# Patient Record
Sex: MALE | Race: WHITE | NOT HISPANIC OR LATINO | Employment: OTHER | URBAN - METROPOLITAN AREA
[De-identification: names, ages, dates, MRNs, and addresses within clinical notes are randomized per-mention and may not be internally consistent; named-entity substitution may affect disease eponyms.]

---

## 2021-06-10 ENCOUNTER — HOSPITAL ENCOUNTER (OUTPATIENT)
Facility: MEDICAL CENTER | Age: 76
End: 2021-06-12
Attending: EMERGENCY MEDICINE | Admitting: INTERNAL MEDICINE
Payer: MEDICARE

## 2021-06-10 ENCOUNTER — APPOINTMENT (OUTPATIENT)
Dept: RADIOLOGY | Facility: MEDICAL CENTER | Age: 76
End: 2021-06-10
Attending: EMERGENCY MEDICINE
Payer: MEDICARE

## 2021-06-10 DIAGNOSIS — N13.9 URINARY OBSTRUCTION: ICD-10-CM

## 2021-06-10 DIAGNOSIS — N17.9 ACUTE RENAL INJURY (HCC): ICD-10-CM

## 2021-06-10 DIAGNOSIS — N20.1 URETERAL STONE: ICD-10-CM

## 2021-06-10 LAB
ALBUMIN SERPL BCP-MCNC: 4.4 G/DL (ref 3.2–4.9)
ALBUMIN/GLOB SERPL: 1.6 G/DL
ALP SERPL-CCNC: 61 U/L (ref 30–99)
ALT SERPL-CCNC: 26 U/L (ref 2–50)
ANION GAP SERPL CALC-SCNC: 11 MMOL/L (ref 7–16)
APPEARANCE UR: CLEAR
AST SERPL-CCNC: 24 U/L (ref 12–45)
BASOPHILS # BLD AUTO: 0.5 % (ref 0–1.8)
BASOPHILS # BLD: 0.03 K/UL (ref 0–0.12)
BILIRUB SERPL-MCNC: 0.7 MG/DL (ref 0.1–1.5)
BILIRUB UR QL STRIP.AUTO: NEGATIVE
BUN SERPL-MCNC: 26 MG/DL (ref 8–22)
CALCIUM SERPL-MCNC: 9 MG/DL (ref 8.4–10.2)
CHLORIDE SERPL-SCNC: 103 MMOL/L (ref 96–112)
CO2 SERPL-SCNC: 25 MMOL/L (ref 20–33)
COLOR UR: YELLOW
CREAT SERPL-MCNC: 2.21 MG/DL (ref 0.5–1.4)
EOSINOPHIL # BLD AUTO: 0.08 K/UL (ref 0–0.51)
EOSINOPHIL NFR BLD: 1.3 % (ref 0–6.9)
EPI CELLS #/AREA URNS HPF: ABNORMAL /HPF
ERYTHROCYTE [DISTWIDTH] IN BLOOD BY AUTOMATED COUNT: 44.3 FL (ref 35.9–50)
GLOBULIN SER CALC-MCNC: 2.8 G/DL (ref 1.9–3.5)
GLUCOSE SERPL-MCNC: 107 MG/DL (ref 65–99)
GLUCOSE UR STRIP.AUTO-MCNC: NEGATIVE MG/DL
HCT VFR BLD AUTO: 40.9 % (ref 42–52)
HGB BLD-MCNC: 13.3 G/DL (ref 14–18)
IMM GRANULOCYTES # BLD AUTO: 0.03 K/UL (ref 0–0.11)
IMM GRANULOCYTES NFR BLD AUTO: 0.5 % (ref 0–0.9)
KETONES UR STRIP.AUTO-MCNC: NEGATIVE MG/DL
LEUKOCYTE ESTERASE UR QL STRIP.AUTO: NEGATIVE
LIPASE SERPL-CCNC: 42 U/L (ref 7–58)
LYMPHOCYTES # BLD AUTO: 1.16 K/UL (ref 1–4.8)
LYMPHOCYTES NFR BLD: 18.5 % (ref 22–41)
MCH RBC QN AUTO: 29.2 PG (ref 27–33)
MCHC RBC AUTO-ENTMCNC: 32.5 G/DL (ref 33.7–35.3)
MCV RBC AUTO: 89.9 FL (ref 81.4–97.8)
MICRO URNS: ABNORMAL
MONOCYTES # BLD AUTO: 0.73 K/UL (ref 0–0.85)
MONOCYTES NFR BLD AUTO: 11.6 % (ref 0–13.4)
NEUTROPHILS # BLD AUTO: 4.25 K/UL (ref 1.82–7.42)
NEUTROPHILS NFR BLD: 67.6 % (ref 44–72)
NITRITE UR QL STRIP.AUTO: NEGATIVE
NRBC # BLD AUTO: 0 K/UL
NRBC BLD-RTO: 0 /100 WBC
PH UR STRIP.AUTO: 6 [PH] (ref 5–8)
PLATELET # BLD AUTO: 184 K/UL (ref 164–446)
PMV BLD AUTO: 9.6 FL (ref 9–12.9)
POTASSIUM SERPL-SCNC: 4.2 MMOL/L (ref 3.6–5.5)
PROT SERPL-MCNC: 7.2 G/DL (ref 6–8.2)
PROT UR QL STRIP: NEGATIVE MG/DL
RBC # BLD AUTO: 4.55 M/UL (ref 4.7–6.1)
RBC # URNS HPF: ABNORMAL /HPF
RBC UR QL AUTO: ABNORMAL
SODIUM SERPL-SCNC: 139 MMOL/L (ref 135–145)
SP GR UR STRIP.AUTO: <=1.005
WBC # BLD AUTO: 6.3 K/UL (ref 4.8–10.8)
WBC #/AREA URNS HPF: ABNORMAL /HPF

## 2021-06-10 PROCEDURE — G0378 HOSPITAL OBSERVATION PER HR: HCPCS

## 2021-06-10 PROCEDURE — 99220 PR INITIAL OBSERVATION CARE,LEVL III: CPT | Performed by: INTERNAL MEDICINE

## 2021-06-10 PROCEDURE — 87426 SARSCOV CORONAVIRUS AG IA: CPT

## 2021-06-10 PROCEDURE — 83690 ASSAY OF LIPASE: CPT

## 2021-06-10 PROCEDURE — 81001 URINALYSIS AUTO W/SCOPE: CPT

## 2021-06-10 PROCEDURE — C9803 HOPD COVID-19 SPEC COLLECT: HCPCS | Performed by: EMERGENCY MEDICINE

## 2021-06-10 PROCEDURE — 700101 HCHG RX REV CODE 250

## 2021-06-10 PROCEDURE — 74176 CT ABD & PELVIS W/O CONTRAST: CPT

## 2021-06-10 PROCEDURE — U0005 INFEC AGEN DETEC AMPLI PROBE: HCPCS

## 2021-06-10 PROCEDURE — U0003 INFECTIOUS AGENT DETECTION BY NUCLEIC ACID (DNA OR RNA); SEVERE ACUTE RESPIRATORY SYNDROME CORONAVIRUS 2 (SARS-COV-2) (CORONAVIRUS DISEASE [COVID-19]), AMPLIFIED PROBE TECHNIQUE, MAKING USE OF HIGH THROUGHPUT TECHNOLOGIES AS DESCRIBED BY CMS-2020-01-R: HCPCS

## 2021-06-10 PROCEDURE — 85025 COMPLETE CBC W/AUTO DIFF WBC: CPT

## 2021-06-10 PROCEDURE — 700105 HCHG RX REV CODE 258: Performed by: EMERGENCY MEDICINE

## 2021-06-10 PROCEDURE — 51702 INSERT TEMP BLADDER CATH: CPT

## 2021-06-10 PROCEDURE — 80053 COMPREHEN METABOLIC PANEL: CPT

## 2021-06-10 PROCEDURE — 303105 HCHG CATHETER EXTRA

## 2021-06-10 PROCEDURE — 99285 EMERGENCY DEPT VISIT HI MDM: CPT

## 2021-06-10 PROCEDURE — 36415 COLL VENOUS BLD VENIPUNCTURE: CPT

## 2021-06-10 RX ORDER — ONDANSETRON 2 MG/ML
4 INJECTION INTRAMUSCULAR; INTRAVENOUS EVERY 4 HOURS PRN
Status: DISCONTINUED | OUTPATIENT
Start: 2021-06-10 | End: 2021-06-12 | Stop reason: HOSPADM

## 2021-06-10 RX ORDER — BISACODYL 10 MG
10 SUPPOSITORY, RECTAL RECTAL
Status: DISCONTINUED | OUTPATIENT
Start: 2021-06-10 | End: 2021-06-12 | Stop reason: HOSPADM

## 2021-06-10 RX ORDER — OXYCODONE HYDROCHLORIDE 5 MG/1
5 TABLET ORAL
Status: DISCONTINUED | OUTPATIENT
Start: 2021-06-10 | End: 2021-06-12 | Stop reason: HOSPADM

## 2021-06-10 RX ORDER — SODIUM CHLORIDE, SODIUM LACTATE, POTASSIUM CHLORIDE, CALCIUM CHLORIDE 600; 310; 30; 20 MG/100ML; MG/100ML; MG/100ML; MG/100ML
1000 INJECTION, SOLUTION INTRAVENOUS ONCE
Status: COMPLETED | OUTPATIENT
Start: 2021-06-10 | End: 2021-06-10

## 2021-06-10 RX ORDER — LIDOCAINE HYDROCHLORIDE 20 MG/ML
JELLY TOPICAL
Status: COMPLETED
Start: 2021-06-10 | End: 2021-06-10

## 2021-06-10 RX ORDER — TERAZOSIN 1 MG/1
1 CAPSULE ORAL NIGHTLY
Status: ON HOLD | COMMUNITY
End: 2021-06-12

## 2021-06-10 RX ORDER — ONDANSETRON 4 MG/1
4 TABLET, ORALLY DISINTEGRATING ORAL EVERY 4 HOURS PRN
Status: DISCONTINUED | OUTPATIENT
Start: 2021-06-10 | End: 2021-06-12 | Stop reason: HOSPADM

## 2021-06-10 RX ORDER — VALSARTAN 80 MG/1
80 TABLET ORAL DAILY
Status: DISCONTINUED | OUTPATIENT
Start: 2021-06-11 | End: 2021-06-12 | Stop reason: HOSPADM

## 2021-06-10 RX ORDER — TERAZOSIN 1 MG/1
1 CAPSULE ORAL NIGHTLY
Status: DISCONTINUED | OUTPATIENT
Start: 2021-06-10 | End: 2021-06-11

## 2021-06-10 RX ORDER — POLYETHYLENE GLYCOL 3350 17 G/17G
1 POWDER, FOR SOLUTION ORAL
Status: DISCONTINUED | OUTPATIENT
Start: 2021-06-10 | End: 2021-06-12 | Stop reason: HOSPADM

## 2021-06-10 RX ORDER — AMOXICILLIN 250 MG
2 CAPSULE ORAL 2 TIMES DAILY
Status: DISCONTINUED | OUTPATIENT
Start: 2021-06-10 | End: 2021-06-12 | Stop reason: HOSPADM

## 2021-06-10 RX ORDER — OXYCODONE HYDROCHLORIDE 10 MG/1
10 TABLET ORAL
Status: DISCONTINUED | OUTPATIENT
Start: 2021-06-10 | End: 2021-06-12 | Stop reason: HOSPADM

## 2021-06-10 RX ORDER — ENALAPRILAT 1.25 MG/ML
1.25 INJECTION INTRAVENOUS EVERY 6 HOURS PRN
Status: DISCONTINUED | OUTPATIENT
Start: 2021-06-10 | End: 2021-06-12 | Stop reason: HOSPADM

## 2021-06-10 RX ORDER — VALSARTAN 80 MG/1
80 TABLET ORAL DAILY
COMMUNITY

## 2021-06-10 RX ORDER — HYDROMORPHONE HYDROCHLORIDE 1 MG/ML
0.5 INJECTION, SOLUTION INTRAMUSCULAR; INTRAVENOUS; SUBCUTANEOUS
Status: DISCONTINUED | OUTPATIENT
Start: 2021-06-10 | End: 2021-06-12 | Stop reason: HOSPADM

## 2021-06-10 RX ORDER — SODIUM CHLORIDE 9 MG/ML
INJECTION, SOLUTION INTRAVENOUS CONTINUOUS
Status: DISCONTINUED | OUTPATIENT
Start: 2021-06-10 | End: 2021-06-12 | Stop reason: HOSPADM

## 2021-06-10 RX ORDER — LABETALOL HYDROCHLORIDE 5 MG/ML
10 INJECTION, SOLUTION INTRAVENOUS EVERY 4 HOURS PRN
Status: DISCONTINUED | OUTPATIENT
Start: 2021-06-10 | End: 2021-06-12 | Stop reason: HOSPADM

## 2021-06-10 RX ORDER — ATORVASTATIN CALCIUM 20 MG/1
20 TABLET, FILM COATED ORAL DAILY
COMMUNITY

## 2021-06-10 RX ORDER — ATORVASTATIN CALCIUM 20 MG/1
20 TABLET, FILM COATED ORAL DAILY
Status: DISCONTINUED | OUTPATIENT
Start: 2021-06-11 | End: 2021-06-12 | Stop reason: HOSPADM

## 2021-06-10 RX ORDER — ACETAMINOPHEN 325 MG/1
650 TABLET ORAL EVERY 6 HOURS PRN
Status: DISCONTINUED | OUTPATIENT
Start: 2021-06-10 | End: 2021-06-12 | Stop reason: HOSPADM

## 2021-06-10 RX ADMIN — LIDOCAINE HYDROCHLORIDE 200 MG: 20 JELLY TOPICAL at 23:00

## 2021-06-10 RX ADMIN — SODIUM CHLORIDE, POTASSIUM CHLORIDE, SODIUM LACTATE AND CALCIUM CHLORIDE 1000 ML: 600; 310; 30; 20 INJECTION, SOLUTION INTRAVENOUS at 22:24

## 2021-06-10 ASSESSMENT — FIBROSIS 4 INDEX: FIB4 SCORE: 1.89

## 2021-06-11 ENCOUNTER — ANESTHESIA EVENT (OUTPATIENT)
Dept: SURGERY | Facility: MEDICAL CENTER | Age: 76
End: 2021-06-11
Payer: MEDICARE

## 2021-06-11 ENCOUNTER — ANESTHESIA (OUTPATIENT)
Dept: SURGERY | Facility: MEDICAL CENTER | Age: 76
End: 2021-06-11
Payer: MEDICARE

## 2021-06-11 ENCOUNTER — APPOINTMENT (OUTPATIENT)
Dept: RADIOLOGY | Facility: MEDICAL CENTER | Age: 76
End: 2021-06-11
Attending: UROLOGY
Payer: MEDICARE

## 2021-06-11 LAB
ANION GAP SERPL CALC-SCNC: 12 MMOL/L (ref 7–16)
BUN SERPL-MCNC: 22 MG/DL (ref 8–22)
CALCIUM SERPL-MCNC: 8.3 MG/DL (ref 8.4–10.2)
CHLORIDE SERPL-SCNC: 106 MMOL/L (ref 96–112)
CO2 SERPL-SCNC: 24 MMOL/L (ref 20–33)
CREAT SERPL-MCNC: 1.59 MG/DL (ref 0.5–1.4)
ERYTHROCYTE [DISTWIDTH] IN BLOOD BY AUTOMATED COUNT: 44.2 FL (ref 35.9–50)
GLUCOSE SERPL-MCNC: 119 MG/DL (ref 65–99)
HCT VFR BLD AUTO: 36.3 % (ref 42–52)
HGB BLD-MCNC: 11.7 G/DL (ref 14–18)
MCH RBC QN AUTO: 28.5 PG (ref 27–33)
MCHC RBC AUTO-ENTMCNC: 32.2 G/DL (ref 33.7–35.3)
MCV RBC AUTO: 88.3 FL (ref 81.4–97.8)
PATHOLOGY CONSULT NOTE: NORMAL
PLATELET # BLD AUTO: 171 K/UL (ref 164–446)
PMV BLD AUTO: 9.8 FL (ref 9–12.9)
POTASSIUM SERPL-SCNC: 4.2 MMOL/L (ref 3.6–5.5)
RBC # BLD AUTO: 4.11 M/UL (ref 4.7–6.1)
SARS-COV+SARS-COV-2 AG RESP QL IA.RAPID: NOTDETECTED
SARS-COV-2 RNA RESP QL NAA+PROBE: NOTDETECTED
SODIUM SERPL-SCNC: 142 MMOL/L (ref 135–145)
SPECIMEN SOURCE: NORMAL
SPECIMEN SOURCE: NORMAL
WBC # BLD AUTO: 4.6 K/UL (ref 4.8–10.8)

## 2021-06-11 PROCEDURE — 85027 COMPLETE CBC AUTOMATED: CPT

## 2021-06-11 PROCEDURE — G0378 HOSPITAL OBSERVATION PER HR: HCPCS

## 2021-06-11 PROCEDURE — 160035 HCHG PACU - 1ST 60 MINS PHASE I: Performed by: UROLOGY

## 2021-06-11 PROCEDURE — 700102 HCHG RX REV CODE 250 W/ 637 OVERRIDE(OP): Performed by: INTERNAL MEDICINE

## 2021-06-11 PROCEDURE — C2617 STENT, NON-COR, TEM W/O DEL: HCPCS | Performed by: UROLOGY

## 2021-06-11 PROCEDURE — 700111 HCHG RX REV CODE 636 W/ 250 OVERRIDE (IP): Performed by: ANESTHESIOLOGY

## 2021-06-11 PROCEDURE — 501329 HCHG SET, CYSTO IRRIG Y TUR: Performed by: UROLOGY

## 2021-06-11 PROCEDURE — 700102 HCHG RX REV CODE 250 W/ 637 OVERRIDE(OP): Performed by: UROLOGY

## 2021-06-11 PROCEDURE — 160039 HCHG SURGERY MINUTES - EA ADDL 1 MIN LEVEL 3: Performed by: UROLOGY

## 2021-06-11 PROCEDURE — 700105 HCHG RX REV CODE 258: Performed by: ANESTHESIOLOGY

## 2021-06-11 PROCEDURE — 99232 SBSQ HOSP IP/OBS MODERATE 35: CPT | Performed by: FAMILY MEDICINE

## 2021-06-11 PROCEDURE — 502240 HCHG MISC OR SUPPLY RC 0272: Performed by: UROLOGY

## 2021-06-11 PROCEDURE — 700101 HCHG RX REV CODE 250: Performed by: ANESTHESIOLOGY

## 2021-06-11 PROCEDURE — 160048 HCHG OR STATISTICAL LEVEL 1-5: Performed by: UROLOGY

## 2021-06-11 PROCEDURE — 500879 HCHG PACK, CYSTO: Performed by: UROLOGY

## 2021-06-11 PROCEDURE — 500062 HCHG BASKET: Performed by: UROLOGY

## 2021-06-11 PROCEDURE — 160028 HCHG SURGERY MINUTES - 1ST 30 MINS LEVEL 3: Performed by: UROLOGY

## 2021-06-11 PROCEDURE — 700105 HCHG RX REV CODE 258: Performed by: INTERNAL MEDICINE

## 2021-06-11 PROCEDURE — 160002 HCHG RECOVERY MINUTES (STAT): Performed by: UROLOGY

## 2021-06-11 PROCEDURE — A9270 NON-COVERED ITEM OR SERVICE: HCPCS | Performed by: INTERNAL MEDICINE

## 2021-06-11 PROCEDURE — 700117 HCHG RX CONTRAST REV CODE 255: Performed by: UROLOGY

## 2021-06-11 PROCEDURE — C1894 INTRO/SHEATH, NON-LASER: HCPCS | Performed by: UROLOGY

## 2021-06-11 PROCEDURE — 160009 HCHG ANES TIME/MIN: Performed by: UROLOGY

## 2021-06-11 PROCEDURE — C1758 CATHETER, URETERAL: HCPCS | Performed by: UROLOGY

## 2021-06-11 PROCEDURE — A9270 NON-COVERED ITEM OR SERVICE: HCPCS | Performed by: UROLOGY

## 2021-06-11 PROCEDURE — 36415 COLL VENOUS BLD VENIPUNCTURE: CPT

## 2021-06-11 PROCEDURE — 80048 BASIC METABOLIC PNL TOTAL CA: CPT

## 2021-06-11 PROCEDURE — 88300 SURGICAL PATH GROSS: CPT

## 2021-06-11 PROCEDURE — 82365 CALCULUS SPECTROSCOPY: CPT

## 2021-06-11 DEVICE — STENT UROLOGICAL POLARIS 6X28  ULTRA: Type: IMPLANTABLE DEVICE | Site: URETER | Status: FUNCTIONAL

## 2021-06-11 RX ORDER — OXYCODONE HCL 5 MG/5 ML
5 SOLUTION, ORAL ORAL
Status: DISCONTINUED | OUTPATIENT
Start: 2021-06-11 | End: 2021-06-11 | Stop reason: HOSPADM

## 2021-06-11 RX ORDER — KETOROLAC TROMETHAMINE 30 MG/ML
INJECTION, SOLUTION INTRAMUSCULAR; INTRAVENOUS PRN
Status: DISCONTINUED | OUTPATIENT
Start: 2021-06-11 | End: 2021-06-11 | Stop reason: SURG

## 2021-06-11 RX ORDER — DIPHENHYDRAMINE HYDROCHLORIDE 50 MG/ML
12.5 INJECTION INTRAMUSCULAR; INTRAVENOUS
Status: DISCONTINUED | OUTPATIENT
Start: 2021-06-11 | End: 2021-06-11 | Stop reason: HOSPADM

## 2021-06-11 RX ORDER — HYDROMORPHONE HYDROCHLORIDE 1 MG/ML
0.2 INJECTION, SOLUTION INTRAMUSCULAR; INTRAVENOUS; SUBCUTANEOUS
Status: DISCONTINUED | OUTPATIENT
Start: 2021-06-11 | End: 2021-06-11 | Stop reason: HOSPADM

## 2021-06-11 RX ORDER — ONDANSETRON 2 MG/ML
4 INJECTION INTRAMUSCULAR; INTRAVENOUS
Status: DISCONTINUED | OUTPATIENT
Start: 2021-06-11 | End: 2021-06-11 | Stop reason: HOSPADM

## 2021-06-11 RX ORDER — SODIUM CHLORIDE, SODIUM LACTATE, POTASSIUM CHLORIDE, CALCIUM CHLORIDE 600; 310; 30; 20 MG/100ML; MG/100ML; MG/100ML; MG/100ML
INJECTION, SOLUTION INTRAVENOUS CONTINUOUS
Status: DISCONTINUED | OUTPATIENT
Start: 2021-06-11 | End: 2021-06-11 | Stop reason: HOSPADM

## 2021-06-11 RX ORDER — DEXAMETHASONE SODIUM PHOSPHATE 4 MG/ML
INJECTION, SOLUTION INTRA-ARTICULAR; INTRALESIONAL; INTRAMUSCULAR; INTRAVENOUS; SOFT TISSUE PRN
Status: DISCONTINUED | OUTPATIENT
Start: 2021-06-11 | End: 2021-06-11 | Stop reason: SURG

## 2021-06-11 RX ORDER — HYDROMORPHONE HYDROCHLORIDE 1 MG/ML
0.5 INJECTION, SOLUTION INTRAMUSCULAR; INTRAVENOUS; SUBCUTANEOUS
Status: DISCONTINUED | OUTPATIENT
Start: 2021-06-11 | End: 2021-06-11 | Stop reason: HOSPADM

## 2021-06-11 RX ORDER — CEFAZOLIN SODIUM 1 G/3ML
INJECTION, POWDER, FOR SOLUTION INTRAMUSCULAR; INTRAVENOUS PRN
Status: DISCONTINUED | OUTPATIENT
Start: 2021-06-11 | End: 2021-06-11 | Stop reason: SURG

## 2021-06-11 RX ORDER — HALOPERIDOL 5 MG/ML
1 INJECTION INTRAMUSCULAR
Status: DISCONTINUED | OUTPATIENT
Start: 2021-06-11 | End: 2021-06-11 | Stop reason: HOSPADM

## 2021-06-11 RX ORDER — HYDRALAZINE HYDROCHLORIDE 20 MG/ML
5 INJECTION INTRAMUSCULAR; INTRAVENOUS
Status: DISCONTINUED | OUTPATIENT
Start: 2021-06-11 | End: 2021-06-11 | Stop reason: HOSPADM

## 2021-06-11 RX ORDER — ONDANSETRON 2 MG/ML
INJECTION INTRAMUSCULAR; INTRAVENOUS PRN
Status: DISCONTINUED | OUTPATIENT
Start: 2021-06-11 | End: 2021-06-11 | Stop reason: SURG

## 2021-06-11 RX ORDER — LIDOCAINE HYDROCHLORIDE 20 MG/ML
INJECTION, SOLUTION EPIDURAL; INFILTRATION; INTRACAUDAL; PERINEURAL PRN
Status: DISCONTINUED | OUTPATIENT
Start: 2021-06-11 | End: 2021-06-11 | Stop reason: SURG

## 2021-06-11 RX ORDER — OXYCODONE HCL 5 MG/5 ML
10 SOLUTION, ORAL ORAL
Status: DISCONTINUED | OUTPATIENT
Start: 2021-06-11 | End: 2021-06-11 | Stop reason: HOSPADM

## 2021-06-11 RX ORDER — MIDAZOLAM HYDROCHLORIDE 1 MG/ML
INJECTION INTRAMUSCULAR; INTRAVENOUS PRN
Status: DISCONTINUED | OUTPATIENT
Start: 2021-06-11 | End: 2021-06-11 | Stop reason: SURG

## 2021-06-11 RX ORDER — SODIUM CHLORIDE, SODIUM GLUCONATE, SODIUM ACETATE, POTASSIUM CHLORIDE AND MAGNESIUM CHLORIDE 526; 502; 368; 37; 30 MG/100ML; MG/100ML; MG/100ML; MG/100ML; MG/100ML
INJECTION, SOLUTION INTRAVENOUS
Status: DISCONTINUED | OUTPATIENT
Start: 2021-06-11 | End: 2021-06-11 | Stop reason: SURG

## 2021-06-11 RX ORDER — HYDROMORPHONE HYDROCHLORIDE 1 MG/ML
0.4 INJECTION, SOLUTION INTRAMUSCULAR; INTRAVENOUS; SUBCUTANEOUS
Status: DISCONTINUED | OUTPATIENT
Start: 2021-06-11 | End: 2021-06-11 | Stop reason: HOSPADM

## 2021-06-11 RX ORDER — TAMSULOSIN HYDROCHLORIDE 0.4 MG/1
0.4 CAPSULE ORAL 2 TIMES DAILY
Status: DISCONTINUED | OUTPATIENT
Start: 2021-06-11 | End: 2021-06-12 | Stop reason: HOSPADM

## 2021-06-11 RX ADMIN — MIDAZOLAM HYDROCHLORIDE 2 MG: 1 INJECTION, SOLUTION INTRAMUSCULAR; INTRAVENOUS at 14:58

## 2021-06-11 RX ADMIN — TAMSULOSIN HYDROCHLORIDE 0.4 MG: 0.4 CAPSULE ORAL at 17:55

## 2021-06-11 RX ADMIN — ONDANSETRON 4 MG: 2 INJECTION INTRAMUSCULAR; INTRAVENOUS at 15:59

## 2021-06-11 RX ADMIN — ATORVASTATIN CALCIUM 20 MG: 20 TABLET, FILM COATED ORAL at 05:21

## 2021-06-11 RX ADMIN — PROPOFOL 150 MG: 10 INJECTION, EMULSION INTRAVENOUS at 15:02

## 2021-06-11 RX ADMIN — FENTANYL CITRATE 50 MCG: 50 INJECTION, SOLUTION INTRAMUSCULAR; INTRAVENOUS at 15:12

## 2021-06-11 RX ADMIN — CEFAZOLIN 2 G: 1 INJECTION, POWDER, FOR SOLUTION INTRAVENOUS at 14:58

## 2021-06-11 RX ADMIN — FENTANYL CITRATE 50 MCG: 50 INJECTION, SOLUTION INTRAMUSCULAR; INTRAVENOUS at 15:29

## 2021-06-11 RX ADMIN — TERAZOSIN HYDROCHLORIDE 1 MG: 1 CAPSULE ORAL at 00:23

## 2021-06-11 RX ADMIN — LIDOCAINE HYDROCHLORIDE 100 MG: 20 INJECTION, SOLUTION EPIDURAL; INFILTRATION; INTRACAUDAL; PERINEURAL at 15:02

## 2021-06-11 RX ADMIN — SODIUM CHLORIDE, SODIUM GLUCONATE, SODIUM ACETATE, POTASSIUM CHLORIDE AND MAGNESIUM CHLORIDE: 526; 502; 368; 37; 30 INJECTION, SOLUTION INTRAVENOUS at 15:46

## 2021-06-11 RX ADMIN — FENTANYL CITRATE 50 MCG: 50 INJECTION, SOLUTION INTRAMUSCULAR; INTRAVENOUS at 15:48

## 2021-06-11 RX ADMIN — FENTANYL CITRATE 100 MCG: 50 INJECTION, SOLUTION INTRAMUSCULAR; INTRAVENOUS at 15:02

## 2021-06-11 RX ADMIN — DEXAMETHASONE SODIUM PHOSPHATE 8 MG: 4 INJECTION, SOLUTION INTRAMUSCULAR; INTRAVENOUS at 15:06

## 2021-06-11 RX ADMIN — SODIUM CHLORIDE, SODIUM GLUCONATE, SODIUM ACETATE, POTASSIUM CHLORIDE AND MAGNESIUM CHLORIDE: 526; 502; 368; 37; 30 INJECTION, SOLUTION INTRAVENOUS at 14:58

## 2021-06-11 RX ADMIN — SODIUM CHLORIDE: 9 INJECTION, SOLUTION INTRAVENOUS at 00:24

## 2021-06-11 RX ADMIN — KETOROLAC TROMETHAMINE 15 MG: 30 INJECTION, SOLUTION INTRAMUSCULAR at 15:59

## 2021-06-11 ASSESSMENT — ENCOUNTER SYMPTOMS
WEAKNESS: 0
NAUSEA: 0
ABDOMINAL PAIN: 0
PALPITATIONS: 0
CHILLS: 0
DIARRHEA: 0
CONSTIPATION: 0
LOSS OF CONSCIOUSNESS: 0
FEVER: 0
DIZZINESS: 0
SHORTNESS OF BREATH: 0
FALLS: 0
TINGLING: 0
STRIDOR: 0
HEADACHES: 0
DEPRESSION: 0
VOMITING: 0
SPUTUM PRODUCTION: 0
MYALGIAS: 0
FLANK PAIN: 1
COUGH: 0

## 2021-06-11 ASSESSMENT — LIFESTYLE VARIABLES
TOTAL SCORE: 0
HAVE PEOPLE ANNOYED YOU BY CRITICIZING YOUR DRINKING: NO
CONSUMPTION TOTAL: POSITIVE
EVER HAD A DRINK FIRST THING IN THE MORNING TO STEADY YOUR NERVES TO GET RID OF A HANGOVER: NO
HOW MANY TIMES IN THE PAST YEAR HAVE YOU HAD 5 OR MORE DRINKS IN A DAY: 15
ALCOHOL_USE: YES
TOTAL SCORE: 0
EVER FELT BAD OR GUILTY ABOUT YOUR DRINKING: NO
ON A TYPICAL DAY WHEN YOU DRINK ALCOHOL HOW MANY DRINKS DO YOU HAVE: 3
AVERAGE NUMBER OF DAYS PER WEEK YOU HAVE A DRINK CONTAINING ALCOHOL: 7
HAVE YOU EVER FELT YOU SHOULD CUT DOWN ON YOUR DRINKING: NO
TOTAL SCORE: 0

## 2021-06-11 ASSESSMENT — COGNITIVE AND FUNCTIONAL STATUS - GENERAL
SUGGESTED CMS G CODE MODIFIER MOBILITY: CH
MOBILITY SCORE: 24
SUGGESTED CMS G CODE MODIFIER DAILY ACTIVITY: CH
DAILY ACTIVITIY SCORE: 24

## 2021-06-11 ASSESSMENT — PAIN DESCRIPTION - PAIN TYPE
TYPE: ACUTE PAIN;SURGICAL PAIN
TYPE: ACUTE PAIN
TYPE: ACUTE PAIN
TYPE: ACUTE PAIN;SURGICAL PAIN

## 2021-06-11 ASSESSMENT — PAIN SCALES - GENERAL: PAIN_LEVEL: 0

## 2021-06-11 ASSESSMENT — PATIENT HEALTH QUESTIONNAIRE - PHQ9
1. LITTLE INTEREST OR PLEASURE IN DOING THINGS: NOT AT ALL
SUM OF ALL RESPONSES TO PHQ9 QUESTIONS 1 AND 2: 0
2. FEELING DOWN, DEPRESSED, IRRITABLE, OR HOPELESS: NOT AT ALL

## 2021-06-11 NOTE — PROGRESS NOTES
Pt had no c/o pain this shift.  Carcamo draining clear, yellow urine.  Pt ambulating with x1 SBA.  Tolerating RA.   Pt has been NPO with sips with meds.

## 2021-06-11 NOTE — H&P
[facility] Medicine History & Physical Note    Date of Service  6/10/2021    Primary Care Physician  Deny Delcid M.D.    Consultants  Urology-Dr. Huggins    Code Status  Full Code    Chief Complaint  Chief Complaint   Patient presents with   • Flank Pain     Patient report of left flank pain with painful with urination and urgency.  No blood in urine. No fever.  Radiating pain to the left side but right now.        History of Presenting Illness  76 y.o. male who presented 6/10/2021 with left flank pain.  States his pain started yesterday, lasted through the night however this morning it improved.  He states then again later in the day it came back, more severe so he presented to the emergency department.  He states when it comes its there for hours however it did completely resolve and he was in his usual state of health this morning.  He also states he does have BPH, takes Hytrin, he noted yesterday he began having difficulty urinating as well, took extra Hytrin today.  He denies any hematuria, no fever chills, dysuria.  I did discuss the case including labs and imaging with the ER physician.    Review of Systems  Review of Systems   Constitutional: Negative for chills, fever and malaise/fatigue.   HENT: Negative for congestion.    Respiratory: Negative for cough, sputum production, shortness of breath and stridor.    Cardiovascular: Negative for chest pain, palpitations and leg swelling.   Gastrointestinal: Negative for abdominal pain, constipation, diarrhea, nausea and vomiting.   Genitourinary: Positive for flank pain. Negative for dysuria and urgency.        Urinary difficulty    Musculoskeletal: Negative for falls and myalgias.   Neurological: Negative for dizziness, tingling, loss of consciousness, weakness and headaches.   Psychiatric/Behavioral: Negative for depression and suicidal ideas.   All other systems reviewed and are negative.      Past Medical History   has a past medical history of  Hypercholesteremia, Hypertension, and Urinary bladder disorder.    Surgical History  None    Family History  Reviewed, nonpertinent    Social History   reports that he has never smoked. He has never used smokeless tobacco. He reports current alcohol use. He reports previous drug use.    Allergies  No Known Allergies    Medications  Prior to Admission Medications   Prescriptions Last Dose Informant Patient Reported? Taking?   atorvastatin (LIPITOR) 20 MG Tab 6/10/2021 at 0700  Yes Yes   Sig: Take 20 mg by mouth every evening.   terazosin (HYTRIN) 1 MG Cap 6/10/2021 at 0400  Yes Yes   Sig: Take 1 mg by mouth every evening.   valsartan (DIOVAN) 80 MG Tab 6/10/2021 at 0700  Yes Yes   Sig: Take 80 mg by mouth every day.      Facility-Administered Medications: None       Physical Exam  Temp:  [36.8 °C (98.3 °F)] 36.8 °C (98.3 °F)  Pulse:  [66-69] 66  Resp:  [16] 16  BP: (120-152)/(66-78) 120/66  SpO2:  [97 %] 97 %    Physical Exam  Vitals and nursing note reviewed.   Constitutional:       General: He is not in acute distress.     Appearance: He is well-developed. He is not toxic-appearing or diaphoretic.   HENT:      Head: Normocephalic and atraumatic.      Right Ear: External ear normal.      Left Ear: External ear normal.      Nose: Nose normal. No congestion or rhinorrhea.      Mouth/Throat:      Mouth: Mucous membranes are dry.      Pharynx: No oropharyngeal exudate.   Eyes:      General:         Right eye: No discharge.         Left eye: No discharge.      Extraocular Movements: Extraocular movements intact.   Neck:      Trachea: No tracheal deviation.   Cardiovascular:      Rate and Rhythm: Normal rate and regular rhythm.      Heart sounds: No murmur heard.   No friction rub. No gallop.    Pulmonary:      Effort: Pulmonary effort is normal. No respiratory distress.      Breath sounds: Normal breath sounds. No stridor. No wheezing or rales.   Chest:      Chest wall: No tenderness.   Abdominal:      General: Bowel  sounds are normal. There is no distension.      Palpations: Abdomen is soft.      Tenderness: There is no abdominal tenderness.   Musculoskeletal:         General: No tenderness. Normal range of motion.      Cervical back: Normal range of motion and neck supple. No edema or erythema.      Right lower leg: No edema.      Left lower leg: No edema.   Lymphadenopathy:      Cervical: No cervical adenopathy.   Skin:     General: Skin is warm and dry.      Coloration: Skin is not jaundiced.      Findings: No erythema or rash.   Neurological:      General: No focal deficit present.      Mental Status: He is alert and oriented to person, place, and time.      Cranial Nerves: No cranial nerve deficit.   Psychiatric:         Mood and Affect: Mood normal.         Behavior: Behavior normal.         Thought Content: Thought content normal.         Judgment: Judgment normal.         Laboratory:  Recent Labs     06/10/21  1824   WBC 6.3   RBC 4.55*   HEMOGLOBIN 13.3*   HEMATOCRIT 40.9*   MCV 89.9   MCH 29.2   MCHC 32.5*   RDW 44.3   PLATELETCT 184   MPV 9.6     Recent Labs     06/10/21  1824   SODIUM 139   POTASSIUM 4.2   CHLORIDE 103   CO2 25   GLUCOSE 107*   BUN 26*   CREATININE 2.21*   CALCIUM 9.0     Recent Labs     06/10/21  1824   ALTSGPT 26   ASTSGOT 24   ALKPHOSPHAT 61   TBILIRUBIN 0.7   LIPASE 42   GLUCOSE 107*         No results for input(s): NTPROBNP in the last 72 hours.      No results for input(s): TROPONINT in the last 72 hours.    Imaging:  CT-RENAL COLIC EVALUATION(A/P W/O)   Final Result         1.  11 mm proximal left ureteral stone producing mild to moderate left hydronephrosis.   2.  Nonobstructing right renal stones.   3.  Right renal cortical scarring. No hydronephrosis.   4.  Distended urinary bladder.   5.  Prostatomegaly.   6.  Atherosclerosis.                     Assessment/Plan:  I anticipate this patient is appropriate for observation status at this time.    Urinary retention- (present on  admission)  Assessment & Plan  -Due to BPH  -Carcamo is now in place, keep Carcamo, patient will likely need to be discharged with  -Await urology recommendations    Hydronephrosis- (present on admission)  Assessment & Plan  -Mild to moderate on the left, due to obstructing left ureteral stone that is 11 mm  -Start IV fluids  -Urology has been consulted, await additional recommendations    Ureteral stone- (present on admission)  Assessment & Plan  -Noted on imaging, may need stenting  -Causing hydronephrosis  -Await urology recommendations    Renal failure- (present on admission)  Assessment & Plan  -Patient does not know his baseline kidney function  -There is worsening from his baseline, I do anticipate improvement to now that a Carcamo has been placed  -Because of this, I do think it is okay to continue his home valsartan  -Obed BMP in the morning, if kidney function has worsened, would recommend stopping ARB    BPH (benign prostatic hyperplasia)- (present on admission)  Assessment & Plan  -Continue home Hytrin  -Cordova urinary retention  -Carcamo is now in place  -Wait additional recommendations per urology    Hypertension- (present on admission)  Assessment & Plan  -Continue home valsartan  -Start as needed enalapril and labetalol   -Adjust as needed    Hypercholesteremia- (present on admission)  Assessment & Plan  -Continue home statin

## 2021-06-11 NOTE — ASSESSMENT & PLAN NOTE
-Continue home Hytrin  -Fitch is now in place for retention  - Discussed with Carl Armas this am - duration of fitch requirement will be addressed by Dr Huggins

## 2021-06-11 NOTE — ASSESSMENT & PLAN NOTE
-Due to BPH  -Fitch is now in place, duration of fitch requirement will be addressed by Dr Huggins

## 2021-06-11 NOTE — ANESTHESIA PROCEDURE NOTES
Airway    Date/Time: 6/11/2021 3:03 PM  Performed by: Ector Wynn M.D.  Authorized by: Ector Wynn M.D.     Location:  OR  Urgency:  Elective  Indications for Airway Management:  Anesthesia      Spontaneous Ventilation: absent    Sedation Level:  Deep  Preoxygenated: Yes    Final Airway Type:  Supraglottic airway  Final Supraglottic Airway:  Standard LMA    SGA Size:  4  Number of Attempts at Approach:  1   Atraumatic insertion, good seal

## 2021-06-11 NOTE — PROGRESS NOTES
[facility] Medicine Daily Progress Note    Date of Service  6/11/2021    Chief Complaint  76 y.o. male admitted 6/10/2021 with L sided flank pain    Hospital Course  76 y.o. male who presented 6/10/2021 with left flank pain.  States his pain started yesterday, lasted through the night however this morning it improved.  He states then again later in the day it came back, more severe so he presented to the emergency department.  He states when it comes its there for hours however it did completely resolve and he was in his usual state of health this morning.  He also states he does have BPH, takes Hytrin, he noted yesterday he began having difficulty urinating as well, took extra Hytrin today.  He denies any hematuria, no fever chills, dysuria.  I did discuss the case including labs and imaging with the ER physician.    Interval Problem Update  6/11 - Patient without complaints today - flank pain is improved, fitch in place and draining. Vitals are stable, afebrile, creatinine is improving. He denies a history of CKD. He states he is leaving town shortly and won't return until 4th of July weekend - Uro aware and will discuss options with pt, currently planned for CULTS today.    Consultants/Specialty  Urology    Code Status  Full Code    Disposition  Home in 1-2 days     Review of Systems  Review of Systems   Constitutional: Negative for chills and fever.   Respiratory: Negative for cough and shortness of breath.    Cardiovascular: Negative for chest pain and leg swelling.   Genitourinary: Negative for dysuria and hematuria.   All other systems reviewed and are negative.       Physical Exam  Temp:  [36.3 °C (97.4 °F)-36.8 °C (98.3 °F)] 36.6 °C (97.8 °F)  Pulse:  [66-73] 69  Resp:  [15-18] 18  BP: (120-152)/(66-78) 151/68  SpO2:  [92 %-97 %] 95 %    Physical Exam  Vitals and nursing note reviewed.   Constitutional:       Appearance: Normal appearance.   HENT:      Head: Normocephalic and atraumatic.      Mouth/Throat:       Mouth: Mucous membranes are moist.   Cardiovascular:      Rate and Rhythm: Normal rate and regular rhythm.      Heart sounds: No murmur heard.     Pulmonary:      Effort: Pulmonary effort is normal. No respiratory distress.      Breath sounds: Normal breath sounds. No wheezing, rhonchi or rales.   Abdominal:      General: Abdomen is flat. Bowel sounds are normal. There is no distension.      Palpations: Abdomen is soft.      Tenderness: There is no abdominal tenderness. There is no guarding or rebound.   Genitourinary:     Comments: Carcamo in place  Musculoskeletal:         General: No swelling.   Neurological:      General: No focal deficit present.      Mental Status: He is alert and oriented to person, place, and time.   Psychiatric:         Behavior: Behavior normal.         Fluids    Intake/Output Summary (Last 24 hours) at 6/11/2021 1152  Last data filed at 6/11/2021 0600  Gross per 24 hour   Intake --   Output 2150 ml   Net -2150 ml       Laboratory  Recent Labs     06/10/21  1824 06/11/21  0415   WBC 6.3 4.6*   RBC 4.55* 4.11*   HEMOGLOBIN 13.3* 11.7*   HEMATOCRIT 40.9* 36.3*   MCV 89.9 88.3   MCH 29.2 28.5   MCHC 32.5* 32.2*   RDW 44.3 44.2   PLATELETCT 184 171   MPV 9.6 9.8     Recent Labs     06/10/21  1824 06/11/21  0415   SODIUM 139 142   POTASSIUM 4.2 4.2   CHLORIDE 103 106   CO2 25 24   GLUCOSE 107* 119*   BUN 26* 22   CREATININE 2.21* 1.59*   CALCIUM 9.0 8.3*                   Imaging  CT-RENAL COLIC EVALUATION(A/P W/O)   Final Result         1.  11 mm proximal left ureteral stone producing mild to moderate left hydronephrosis.   2.  Nonobstructing right renal stones.   3.  Right renal cortical scarring. No hydronephrosis.   4.  Distended urinary bladder.   5.  Prostatomegaly.   6.  Atherosclerosis.                    Assessment/Plan  Hydronephrosis- (present on admission)  Assessment & Plan  -Mild to moderate on the left, due to obstructing left ureteral stone that is 11 mm  -Continue IV  fluids  -Urology has been consulted, OR this afternoon    Ureteral stone- (present on admission)  Assessment & Plan  -Noted on imaging, may need stenting  -Causing hydronephrosis  -Await urology recommendations    Renal failure- (present on admission)  Assessment & Plan  -Patient does not know his baseline kidney function but denies a known history of CKD   - Cr improved with fitch placement, anticipate continued improvement with fitch and planned CULTS - ok to continue ARB      Urinary retention- (present on admission)  Assessment & Plan  -Due to BPH  -Fitch is now in place, duration of fitch requirement will be addressed by Dr Huggins    BPH (benign prostatic hyperplasia)- (present on admission)  Assessment & Plan  -Continue home Hytrin  -Fitch is now in place for retention  - Discussed with Carl Armas this am - duration of fitch requirement will be addressed by Dr Huggins     Hypertension- (present on admission)  Assessment & Plan  -Continue home valsartan as he already received today's dose and Cr is improving with fitch placement   -Start as needed enalapril and labetalol   -Adjust as needed    Hypercholesteremia- (present on admission)  Assessment & Plan  -Continue home statin       VTE prophylaxis: SCDs - OR this afternoon

## 2021-06-11 NOTE — PROGRESS NOTES
4 Eyes Skin Assessment Completed by ARBEN Saleem and ARBEN Allan.    Head WDL  Ears Redness and Blanching  Nose WDL  Mouth WDL  Neck WDL  Breast/Chest WDL  Shoulder Blades WDL  Spine WDL  (R) Arm/Elbow/Hand WDL  (L) Arm/Elbow/Hand WDL  Abdomen WDL  Groin WDL  Scrotum/Coccyx/Buttocks B;anching redness to scrotum/WDL/WDL  (R) Leg WDL  (L) Leg WDL  (R) Heel/Foot/Toe WDL  (L) Heel/Foot/Toe WDL          Devices In Places Carcamo      Interventions In Place Pillows, Barrier Cream and Pressure Redistribution Mattress    Possible Skin Injury No    Pictures Uploaded Into Epic N/A  Wound Consult Placed N/A  RN Wound Prevention Protocol Ordered No

## 2021-06-11 NOTE — ASSESSMENT & PLAN NOTE
-Continue home valsartan as he already received today's dose and Cr is improving with fitch placement   -Start as needed enalapril and labetalol   -Adjust as needed

## 2021-06-11 NOTE — ED PROVIDER NOTES
ED Provider Note    ER PROVIDER NOTE        CHIEF COMPLAINT  Chief Complaint   Patient presents with   • Flank Pain     Patient report of left flank pain with painful with urination and urgency.  No blood in urine. No fever.  Radiating pain to the left side but right now.        HPI  Mejia Castillo is a 76 y.o. male who presents to the emergency department complaining of left-sided flank pain.  Patient reports yesterday he began to have some vague flank pain on the left, has become more intense, does wax and wane and radiate to the front.  He also reports some urinary hesitancy and pain with urination over similar duration.  No blood in his urine, no fevers or chills, no nausea or vomiting.  No other abdominal pain.    REVIEW OF SYSTEMS  Pertinent positives include leg pain. Pertinent negatives include no fever. See HPI for details. All other systems reviewed and are negative.    PAST MEDICAL HISTORY   has a past medical history of Hypercholesteremia, Hypertension, and Urinary bladder disorder.    SURGICAL HISTORY  patient denies any surgical history    FAMILY HISTORY  History reviewed. No pertinent family history.    SOCIAL HISTORY  Social History     Socioeconomic History   • Marital status:      Spouse name: Not on file   • Number of children: Not on file   • Years of education: Not on file   • Highest education level: Not on file   Occupational History   • Not on file   Tobacco Use   • Smoking status: Never Smoker   • Smokeless tobacco: Never Used   Vaping Use   • Vaping Use: Never used   Substance and Sexual Activity   • Alcohol use: Yes     Comment: wine    • Drug use: Not Currently   • Sexual activity: Not on file   Other Topics Concern   • Not on file   Social History Narrative   • Not on file     Social Determinants of Health     Financial Resource Strain:    • Difficulty of Paying Living Expenses:    Food Insecurity:    • Worried About Running Out of Food in the Last Year:    • Ran Out of Food in the  Last Year:    Transportation Needs:    • Lack of Transportation (Medical):    • Lack of Transportation (Non-Medical):    Physical Activity:    • Days of Exercise per Week:    • Minutes of Exercise per Session:    Stress:    • Feeling of Stress :    Social Connections:    • Frequency of Communication with Friends and Family:    • Frequency of Social Gatherings with Friends and Family:    • Attends Buddhist Services:    • Active Member of Clubs or Organizations:    • Attends Club or Organization Meetings:    • Marital Status:    Intimate Partner Violence:    • Fear of Current or Ex-Partner:    • Emotionally Abused:    • Physically Abused:    • Sexually Abused:       Social History     Substance and Sexual Activity   Drug Use Not Currently       CURRENT MEDICATIONS  Home Medications     Reviewed by Audrey Lemos R.N. (Registered Nurse) on 06/10/21 at 1819  Med List Status: Complete   Medication Last Dose Status   atorvastatin (LIPITOR) 20 MG Tab 6/10/2021 Active   terazosin (HYTRIN) 1 MG Cap 6/10/2021 Active   valsartan (DIOVAN) 80 MG Tab 6/10/2021 Active                ALLERGIES  No Known Allergies    PHYSICAL EXAM  VITAL SIGNS: /78   Pulse 69   Temp 36.8 °C (98.3 °F) (Temporal)   Resp 16   Ht 1.829 m (6')   Wt 87 kg (191 lb 12.8 oz)   SpO2 97%   BMI 26.01 kg/m²   Pulse ox interpretation: I interpret this pulse ox as normal.    Constitutional: Alert in no apparent distress.  HENT: No signs of trauma, Bilateral external ears normal, Nose normal.   Eyes: Pupils are equal and reactive, Conjunctiva normal, Non-icteric.   Neck: Normal range of motion, No tenderness, Supple, No stridor.   Lymphatic: No lymphadenopathy noted.   Cardiovascular: Regular rate and rhythm, no murmurs.   Thorax & Lungs: Normal breath sounds, No respiratory distress, No wheezing, No chest tenderness.   Abdomen: Bowel sounds normal, Soft, No tenderness, No masses, No pulsatile masses. No peritoneal signs.  Skin: Warm, Dry, No  erythema, No rash.   Back: No bony tenderness, No CVA tenderness.   Extremities: Intact distal pulses, No edema, No tenderness, No cyanosis, Musculoskeletal: Good range of motion in all major joints. No tenderness to palpation or major deformities noted.   Neurologic: Alert , Normal motor function, Normal sensory function, No focal deficits noted.   Psychiatric: Affect normal, Judgment normal, Mood normal.     DIAGNOSTIC STUDIES / PROCEDURES    Labs Reviewed   CBC WITH DIFFERENTIAL - Abnormal; Notable for the following components:       Result Value    RBC 4.55 (*)     Hemoglobin 13.3 (*)     Hematocrit 40.9 (*)     MCHC 32.5 (*)     Lymphocytes 18.50 (*)     All other components within normal limits   COMP METABOLIC PANEL - Abnormal; Notable for the following components:    Glucose 107 (*)     Bun 26 (*)     Creatinine 2.21 (*)     All other components within normal limits   URINALYSIS - Abnormal; Notable for the following components:    Occult Blood Small (*)     All other components within normal limits   URINE MICROSCOPIC (W/UA) - Abnormal; Notable for the following components:    WBC 0-2 (*)     RBC 10-20 (*)     All other components within normal limits   ESTIMATED GFR - Abnormal; Notable for the following components:    GFR If  35 (*)     GFR If Non  29 (*)     All other components within normal limits   LIPASE   SARS-COV-2, PCR (IN-HOUSE)       RADIOLOGY  CT-RENAL COLIC EVALUATION(A/P W/O)   Final Result         1.  11 mm proximal left ureteral stone producing mild to moderate left hydronephrosis.   2.  Nonobstructing right renal stones.   3.  Right renal cortical scarring. No hydronephrosis.   4.  Distended urinary bladder.   5.  Prostatomegaly.   6.  Atherosclerosis.                 The radiologist's interpretation of all radiological studies have been reviewed and images independently viewed by me.    COURSE & MEDICAL DECISION MAKING  Nursing notes, VS, PMSFHx reviewed in  chart.    8:51 PM Patient seen and examined at bedside.  Pain is resolved, does not want other medicines ordered for cbc. Cmp, ua, CT to evaluate his symptoms.     10:07 PM  Patient is reevaluated, updated on all results, will plan for Carcamo catheter, hospitalization    I discussed the case with Dr. Huggins from urology.  He agrees with Carcamo for his urinary outlet obstruction, likely stent tomorrow so n.p.o. at midnight    HYDRATION: Based on the patient's presentation of Acute Kindney Injury the patient was given IV fluids. IV Hydration was used because oral hydration was not as rapid as required. Upon recheck following hydration, the patient was improved.    Decision Making:  This is a 76 y.o. male with flank pain and difficulty urinating.  Patient was found to have an 11 mm left ureteral stone likely causing his pain.  However he does appear to have some concomitant enlarged prostate causing bladder outlet obstruction which is resulted in renal injury.  As such, Carcamo will be placed to relieve the obstruction, patient will be admitted for further care, hydration, likely urologic intervention tomorrow.  No findings suggestive of concomitant urinary tract infection.  No evidence of other surgical intra-abdominal process on his imaging    Pt is admitted in stable condition    FINAL IMPRESSION  1. Ureteral stone    2. Acute renal injury (HCC)    3. Urinary obstruction         The note accurately reflects work and decisions made by me.  Thang Hernandez M.D.  6/10/2021  10:21 PM

## 2021-06-11 NOTE — ASSESSMENT & PLAN NOTE
-Mild to moderate on the left, due to obstructing left ureteral stone that is 11 mm  -Continue IV fluids  -Urology has been consulted, OR this afternoon

## 2021-06-11 NOTE — ANESTHESIA PREPROCEDURE EVALUATION
Left ureteral stone with hydronephrosis and CARLY. HTN, Denies: MI/CHF/smoking/CVA/DM/CKD      Relevant Problems   CARDIAC   (positive) Hypertension         (positive) Hydronephrosis   (positive) Renal failure       Physical Exam    Airway   Mallampati: II  TM distance: >3 FB  Neck ROM: full       Cardiovascular - normal exam  Rhythm: regular  Rate: normal  (-) murmur     Dental - normal exam           Pulmonary - normal exam  Breath sounds clear to auscultation     Abdominal    Neurological - normal exam                 Anesthesia Plan    ASA 2       Plan - general       Airway plan will be LMA          Induction: intravenous    Postoperative Plan: Postoperative administration of opioids is intended.    Pertinent diagnostic labs and testing reviewed    Informed Consent:    Anesthetic plan and risks discussed with patient.    Use of blood products discussed with: patient whom consented to blood products.

## 2021-06-11 NOTE — CONSULTS
UROLOGY Consult Note:    Carl Armas P.A.-C.  Date & Time note created:    6/11/2021   8:53 AM     Referring MD:  Dr. Hernandez    Patient ID:   Name:             Mejia Castillo     YOB: 1945  Age:                 76 y.o.  male   MRN:               [id number]                                                             Reason for Consult:      Right ureteral stone and urinary retention    History of Present Illness:    Consult requested for this pleasant 75 yo male. 2 days ago he began experiencing pains in his left flank with radiation into his left abdomen. Near the same time he had episodes of stranguria. He called his PCP who advised he be evaulated in the ER. Work up has revealed urinary retention with CARLY and an obstructing 11 mm right UPJ stone. He was admitted and at present pain has resolved. Renal functions have improved.     Review of Systems:      Constitutional: Denies fevers   Eyes: Denies changes in vision   Ears/Nose/Throat/Mouth: Denies nasal congestion   Cardiovascular: no chest pain   Respiratory: no shortness of breath   Gastrointestinal/Hepatic: abdominal pain   Genitourinary: Denies hematuria, dysuria or frequency  Musculoskeletal/Rheum: Denies joint pain   Skin: Denies rash  Neurological: Denies headache   Psychiatric: denies mood disorder   Endocrine: Ila thyroid problems  Heme/Oncology/Lymph Nodes: Denies enlarged lymph nodes   All other systems were reviewed and are negative (AMA/CMS criteria)                Past Medical History:   Past Medical History:   Diagnosis Date   • Hypercholesteremia    • Hypertension    • Urinary bladder disorder      Active Hospital Problems    Diagnosis    • Hypercholesteremia [E78.00]    • Hypertension [I10]    • BPH (benign prostatic hyperplasia) [N40.0]    • Urinary retention [R33.9]    • Renal failure [N19]    • Ureteral stone [N20.1]    • Hydronephrosis [N13.30]        Past Surgical History:  History reviewed. No pertinent surgical  history.    Hospital Medications:    Current Facility-Administered Medications:   •  atorvastatin (LIPITOR) tablet 20 mg, 20 mg, Oral, DAILY, César Reardon D.O., 20 mg at 06/11/21 0521  •  terazosin (HYTRIN) capsule 1 mg, 1 mg, Oral, Nightly, César Reardon D.O., 1 mg at 06/11/21 0023  •  valsartan (DIOVAN) tablet 80 mg, 80 mg, Oral, DAILY, César Reardon D.O.  •  senna-docusate (PERICOLACE or SENOKOT S) 8.6-50 MG per tablet 2 tablet, 2 tablet, Oral, BID **AND** polyethylene glycol/lytes (MIRALAX) PACKET 1 Packet, 1 Packet, Oral, QDAY PRN **AND** magnesium hydroxide (MILK OF MAGNESIA) suspension 30 mL, 30 mL, Oral, QDAY PRN **AND** bisacodyl (DULCOLAX) suppository 10 mg, 10 mg, Rectal, QDAY PRN, César Reardon D.O.  •  NS infusion, , Intravenous, Continuous, César Reardon D.O., Last Rate: 100 mL/hr at 06/11/21 0024, New Bag at 06/11/21 0024  •  acetaminophen (Tylenol) tablet 650 mg, 650 mg, Oral, Q6HRS PRN, César Reardon D.O.  •  Notify provider if pain remains uncontrolled, , , CONTINUOUS **AND** Use the Numeric Rating Scale (NRS), Wilkins-Baker Faces (WBF), or FLACC on regular floors and Critical-Care Pain Observation Tool (CPOT) on ICUs/Trauma to assess pain, , , CONTINUOUS **AND** Pulse Ox, , , CONTINUOUS **AND** Pharmacy Consult Request ...Pain Management Review 1 Each, 1 Each, Other, PHARMACY TO DOSE **AND** If patient difficult to arouse and/or has respiratory depression (respiratory rate of 10 or less), stop any opiates that are currently infusing and call a Rapid Response., , , CONTINUOUS, César Reardon D.O.  •  oxyCODONE immediate-release (ROXICODONE) tablet 5 mg, 5 mg, Oral, Q3HRS PRN **OR** oxyCODONE immediate release (ROXICODONE) tablet 10 mg, 10 mg, Oral, Q3HRS PRN **OR** HYDROmorphone (Dilaudid) injection 0.5 mg, 0.5 mg, Intravenous, Q3HRS PRN, César Reardon D.O.  •  enalaprilat (VASOTEC) injection 1.25 mg, 1.25 mg, Intravenous, Q6HRS PRN, César Reardon D.O.  •  labetalol  (NORMODYNE/TRANDATE) injection 10 mg, 10 mg, Intravenous, Q4HRS PRN, César Reardon D.O.  •  ondansetron (ZOFRAN) syringe/vial injection 4 mg, 4 mg, Intravenous, Q4HRS PRN, César Reardon D.O.  •  ondansetron (ZOFRAN ODT) dispertab 4 mg, 4 mg, Oral, Q4HRS PRN, César Reardon D.O.    Current Outpatient Medications:  Medications Prior to Admission   Medication Sig Dispense Refill Last Dose   • terazosin (HYTRIN) 1 MG Cap Take 1 mg by mouth every evening.   6/10/2021 at 0400   • atorvastatin (LIPITOR) 20 MG Tab Take 20 mg by mouth every day.   6/10/2021 at 0700   • valsartan (DIOVAN) 80 MG Tab Take 80 mg by mouth every day.   6/10/2021 at 0700       Medication Allergy:  No Known Allergies    Family History:  History reviewed. No pertinent family history.    Social History:  Social History     Socioeconomic History   • Marital status:      Spouse name: Not on file   • Number of children: Not on file   • Years of education: Not on file   • Highest education level: Not on file   Occupational History   • Not on file   Tobacco Use   • Smoking status: Never Smoker   • Smokeless tobacco: Never Used   Vaping Use   • Vaping Use: Never used   Substance and Sexual Activity   • Alcohol use: Yes     Comment: wine    • Drug use: Not Currently   • Sexual activity: Not on file   Other Topics Concern   • Not on file   Social History Narrative   • Not on file     Social Determinants of Health     Financial Resource Strain:    • Difficulty of Paying Living Expenses:    Food Insecurity:    • Worried About Running Out of Food in the Last Year:    • Ran Out of Food in the Last Year:    Transportation Needs:    • Lack of Transportation (Medical):    • Lack of Transportation (Non-Medical):    Physical Activity:    • Days of Exercise per Week:    • Minutes of Exercise per Session:    Stress:    • Feeling of Stress :    Social Connections:    • Frequency of Communication with Friends and Family:    • Frequency of Social Gatherings  with Friends and Family:    • Attends Muslim Services:    • Active Member of Clubs or Organizations:    • Attends Club or Organization Meetings:    • Marital Status:    Intimate Partner Violence:    • Fear of Current or Ex-Partner:    • Emotionally Abused:    • Physically Abused:    • Sexually Abused:          Physical Exam:  Vitals/ General Appearance:   Weight/BMI: Body mass index is 25.27 kg/m².  /72   Pulse 73   Temp 36.3 °C (97.4 °F) (Oral)   Resp 15   Ht 1.829 m (6')   Wt 84.5 kg (186 lb 4.6 oz)   SpO2 94%   Vitals:    06/10/21 2200 06/10/21 2314 06/10/21 2351 06/11/21 0421   BP: 120/66 150/72 149/67 137/72   Pulse: 66 67 66 73   Resp: 16  17 15   Temp:   36.4 °C (97.5 °F) 36.3 °C (97.4 °F)   TempSrc:   Oral Oral   SpO2: 97% 92% 96% 94%   Weight:   84.5 kg (186 lb 4.6 oz)    Height:   1.829 m (6')      Oxygen Therapy:  Pulse Oximetry: 94 %, O2 (LPM): 0, O2 Delivery Device: None - Room Air    Constitutional:   Well developed, Well nourished, No acute distress  HENMT:  Normocephalic, Atraumatic, Oropharynx moist mucous membranes, No oral exudates, Nose normal.  No thyromegaly.  Eyes:  EOMI, Conjunctiva normal, No discharge.  Neck:  Normal range of motion, No cervical tenderness.  Cardiovascular:  Normal heart rate, Normal rhythm, No murmurs, No rubs, No gallops.   Extremitites with intact distal pulses, no cyanosis, or edema.  Lungs:  Normal breath sounds, breath sounds clear to auscultation bilaterally,  no crackles, no wheezing.   Abdomen: Bowel sounds normal, Soft, No tenderness, No guarding, No rebound, No masses, No hepatosplenomegaly.  : -CVAT  Skin: Warm, Dry, No erythema, No rash, no induration.  Neurologic: Alert & oriented x 3, No focal deficits noted.  Psychiatric: Affect normal, Judgment normal, Mood normal.      MDM (Data Review):     Records reviewed and summarized in current documentation    Lab Data Review:  Recent Results (from the past 24 hour(s))   URINALYSIS    Collection  Time: 06/10/21  6:12 PM    Specimen: Urine, Clean Catch   Result Value Ref Range    Color Yellow     Character Clear     Specific Gravity <=1.005 <1.035    Ph 6.0 5.0 - 8.0    Glucose Negative Negative mg/dL    Ketones Negative Negative mg/dL    Protein Negative Negative mg/dL    Bilirubin Negative Negative    Nitrite Negative Negative    Leukocyte Esterase Negative Negative    Occult Blood Small (A) Negative    Micro Urine Req Microscopic    URINE MICROSCOPIC (W/UA)    Collection Time: 06/10/21  6:12 PM   Result Value Ref Range    WBC 0-2 (A) /hpf    RBC 10-20 (A) /hpf    Epithelial Cells Rare Few /hpf   CBC WITH DIFFERENTIAL    Collection Time: 06/10/21  6:24 PM   Result Value Ref Range    WBC 6.3 4.8 - 10.8 K/uL    RBC 4.55 (L) 4.70 - 6.10 M/uL    Hemoglobin 13.3 (L) 14.0 - 18.0 g/dL    Hematocrit 40.9 (L) 42.0 - 52.0 %    MCV 89.9 81.4 - 97.8 fL    MCH 29.2 27.0 - 33.0 pg    MCHC 32.5 (L) 33.7 - 35.3 g/dL    RDW 44.3 35.9 - 50.0 fL    Platelet Count 184 164 - 446 K/uL    MPV 9.6 9.0 - 12.9 fL    Neutrophils-Polys 67.60 44.00 - 72.00 %    Lymphocytes 18.50 (L) 22.00 - 41.00 %    Monocytes 11.60 0.00 - 13.40 %    Eosinophils 1.30 0.00 - 6.90 %    Basophils 0.50 0.00 - 1.80 %    Immature Granulocytes 0.50 0.00 - 0.90 %    Nucleated RBC 0.00 /100 WBC    Neutrophils (Absolute) 4.25 1.82 - 7.42 K/uL    Lymphs (Absolute) 1.16 1.00 - 4.80 K/uL    Monos (Absolute) 0.73 0.00 - 0.85 K/uL    Eos (Absolute) 0.08 0.00 - 0.51 K/uL    Baso (Absolute) 0.03 0.00 - 0.12 K/uL    Immature Granulocytes (abs) 0.03 0.00 - 0.11 K/uL    NRBC (Absolute) 0.00 K/uL   COMP METABOLIC PANEL    Collection Time: 06/10/21  6:24 PM   Result Value Ref Range    Sodium 139 135 - 145 mmol/L    Potassium 4.2 3.6 - 5.5 mmol/L    Chloride 103 96 - 112 mmol/L    Co2 25 20 - 33 mmol/L    Anion Gap 11.0 7.0 - 16.0    Glucose 107 (H) 65 - 99 mg/dL    Bun 26 (H) 8 - 22 mg/dL    Creatinine 2.21 (H) 0.50 - 1.40 mg/dL    Calcium 9.0 8.4 - 10.2 mg/dL    AST(SGOT)  24 12 - 45 U/L    ALT(SGPT) 26 2 - 50 U/L    Alkaline Phosphatase 61 30 - 99 U/L    Total Bilirubin 0.7 0.1 - 1.5 mg/dL    Albumin 4.4 3.2 - 4.9 g/dL    Total Protein 7.2 6.0 - 8.2 g/dL    Globulin 2.8 1.9 - 3.5 g/dL    A-G Ratio 1.6 g/dL   LIPASE    Collection Time: 06/10/21  6:24 PM   Result Value Ref Range    Lipase 42 7 - 58 U/L   ESTIMATED GFR    Collection Time: 06/10/21  6:24 PM   Result Value Ref Range    GFR If African American 35 (A) >60 mL/min/1.73 m 2    GFR If Non  29 (A) >60 mL/min/1.73 m 2   SARS-CoV-2 PCR (24 hour In-House): Collect NP swab in VTM    Collection Time: 06/10/21 11:23 PM    Specimen: Respirate   Result Value Ref Range    SARS-CoV-2 Source NP Swab    SARS-COV Antigen THAIS: Collect dry nasal swab AND NP swab in VTM    Collection Time: 06/10/21 11:23 PM   Result Value Ref Range    SARS-CoV-2 Source NP Swab     SARS-COV ANTIGEN THAIS NotDetected Not-Detected   CBC without Differential    Collection Time: 06/11/21  4:15 AM   Result Value Ref Range    WBC 4.6 (L) 4.8 - 10.8 K/uL    RBC 4.11 (L) 4.70 - 6.10 M/uL    Hemoglobin 11.7 (L) 14.0 - 18.0 g/dL    Hematocrit 36.3 (L) 42.0 - 52.0 %    MCV 88.3 81.4 - 97.8 fL    MCH 28.5 27.0 - 33.0 pg    MCHC 32.2 (L) 33.7 - 35.3 g/dL    RDW 44.2 35.9 - 50.0 fL    Platelet Count 171 164 - 446 K/uL    MPV 9.8 9.0 - 12.9 fL   Basic Metabolic Panel (BMP)    Collection Time: 06/11/21  4:15 AM   Result Value Ref Range    Sodium 142 135 - 145 mmol/L    Potassium 4.2 3.6 - 5.5 mmol/L    Chloride 106 96 - 112 mmol/L    Co2 24 20 - 33 mmol/L    Glucose 119 (H) 65 - 99 mg/dL    Bun 22 8 - 22 mg/dL    Creatinine 1.59 (H) 0.50 - 1.40 mg/dL    Calcium 8.3 (L) 8.4 - 10.2 mg/dL    Anion Gap 12.0 7.0 - 16.0   ESTIMATED GFR    Collection Time: 06/11/21  4:15 AM   Result Value Ref Range    GFR If  52 (A) >60 mL/min/1.73 m 2    GFR If Non  43 (A) >60 mL/min/1.73 m 2       Imaging/Procedures Review:    Reviewed    [facility] (Assessment  and Plan):       75 yo with 11 mm obstructing left ureteral stone with urinary retention and CARLY. His symptoms are improved and CARLY declined following placement of a fitch catheter. I offered discharge home with outpatient follow up but he [place] pressing [place] to [place] and would prefer surgical intervention now. We will arrange for left CULTS today and I have discussed risks of pain, bleeding, infection, perforation and stent bother. The fitch will remain for now. Thank you for this consult. Dr. Huggins is aware and has directed this plan of care.

## 2021-06-11 NOTE — ASSESSMENT & PLAN NOTE
-Patient does not know his baseline kidney function but denies a known history of CKD   - Cr improved with fitch placement, anticipate continued improvement with fitch and planned CULTS - ok to continue ARB

## 2021-06-11 NOTE — ED TRIAGE NOTES
76 yr old male to triage  Chief Complaint   Patient presents with   • Flank Pain     Patient report of left flank pain with painful with urination and urgency.  No blood in urine. No fever.  Radiating pain to the left side but not right now        /78   Pulse 69   Temp 36.8 °C (98.3 °F) (Temporal)   Resp 16   Ht 1.829 m (6')   Wt 87 kg (191 lb 12.8 oz)   SpO2 97%   BMI 26.01 kg/m²

## 2021-06-11 NOTE — ANESTHESIA TIME REPORT
Anesthesia Start and Stop Event Times     Date Time Event    6/11/2021 1454 Ready for Procedure     1458 Anesthesia Start     1611 Anesthesia Stop        Responsible Staff  06/11/21    Name Role Begin End    Ector Wynn M.D. Anesth 1458 1611        Preop Diagnosis (Free Text):  Pre-op Diagnosis     11 mm obstructing left ureteral stone with urinary retention and CARLY        Preop Diagnosis (Codes):    Post op Diagnosis  Left ureteral stone      Premium Reason  G. Contracted, Vacation    Comments: On vacation, but on call after 3pm

## 2021-06-11 NOTE — OR NURSING
1610: Patient arrived from OR via bed. Patient is on 6L mask with oral airway in place. VSS. Patient sleeping. Carcamo catheter in place with some bloody output.   1623: Oral airway d/c'd   1630: Patient sleeping at this time VSS  1640: Patient denies pain, denies nausea. Resting comfortably. Transitioned to 3L mask.  1652: Transitioned to room air.  1700: Patient appropriate for transfer to floor. Report given to ARBEN Allan   1705: Placed on 1L O2 for an oxygen sat of 89-91%% for transportation. Patient leaving stable with no changes to surgical site.

## 2021-06-12 VITALS
OXYGEN SATURATION: 93 % | TEMPERATURE: 97.3 F | DIASTOLIC BLOOD PRESSURE: 54 MMHG | BODY MASS INDEX: 25.23 KG/M2 | HEIGHT: 72 IN | WEIGHT: 186.29 LBS | SYSTOLIC BLOOD PRESSURE: 114 MMHG | HEART RATE: 65 BPM | RESPIRATION RATE: 15 BRPM

## 2021-06-12 LAB
ANION GAP SERPL CALC-SCNC: 11 MMOL/L (ref 7–16)
BASOPHILS # BLD AUTO: 0.2 % (ref 0–1.8)
BASOPHILS # BLD: 0.01 K/UL (ref 0–0.12)
BUN SERPL-MCNC: 21 MG/DL (ref 8–22)
CALCIUM SERPL-MCNC: 7.6 MG/DL (ref 8.4–10.2)
CHLORIDE SERPL-SCNC: 106 MMOL/L (ref 96–112)
CO2 SERPL-SCNC: 22 MMOL/L (ref 20–33)
CREAT SERPL-MCNC: 1.11 MG/DL (ref 0.5–1.4)
EOSINOPHIL # BLD AUTO: 0 K/UL (ref 0–0.51)
EOSINOPHIL NFR BLD: 0 % (ref 0–6.9)
ERYTHROCYTE [DISTWIDTH] IN BLOOD BY AUTOMATED COUNT: 43.5 FL (ref 35.9–50)
GLUCOSE SERPL-MCNC: 118 MG/DL (ref 65–99)
HCT VFR BLD AUTO: 35.1 % (ref 42–52)
HGB BLD-MCNC: 11.2 G/DL (ref 14–18)
IMM GRANULOCYTES # BLD AUTO: 0.02 K/UL (ref 0–0.11)
IMM GRANULOCYTES NFR BLD AUTO: 0.4 % (ref 0–0.9)
LYMPHOCYTES # BLD AUTO: 0.73 K/UL (ref 1–4.8)
LYMPHOCYTES NFR BLD: 16.2 % (ref 22–41)
MCH RBC QN AUTO: 28.4 PG (ref 27–33)
MCHC RBC AUTO-ENTMCNC: 31.9 G/DL (ref 33.7–35.3)
MCV RBC AUTO: 88.9 FL (ref 81.4–97.8)
MONOCYTES # BLD AUTO: 0.23 K/UL (ref 0–0.85)
MONOCYTES NFR BLD AUTO: 5.1 % (ref 0–13.4)
NEUTROPHILS # BLD AUTO: 3.52 K/UL (ref 1.82–7.42)
NEUTROPHILS NFR BLD: 78.1 % (ref 44–72)
NRBC # BLD AUTO: 0 K/UL
NRBC BLD-RTO: 0 /100 WBC
PLATELET # BLD AUTO: 183 K/UL (ref 164–446)
PMV BLD AUTO: 10 FL (ref 9–12.9)
POTASSIUM SERPL-SCNC: 4.4 MMOL/L (ref 3.6–5.5)
RBC # BLD AUTO: 3.95 M/UL (ref 4.7–6.1)
SODIUM SERPL-SCNC: 139 MMOL/L (ref 135–145)
WBC # BLD AUTO: 4.5 K/UL (ref 4.8–10.8)

## 2021-06-12 PROCEDURE — 85025 COMPLETE CBC W/AUTO DIFF WBC: CPT

## 2021-06-12 PROCEDURE — A9270 NON-COVERED ITEM OR SERVICE: HCPCS | Performed by: INTERNAL MEDICINE

## 2021-06-12 PROCEDURE — 99217 PR OBSERVATION CARE DISCHARGE: CPT | Performed by: FAMILY MEDICINE

## 2021-06-12 PROCEDURE — G0378 HOSPITAL OBSERVATION PER HR: HCPCS

## 2021-06-12 PROCEDURE — 700102 HCHG RX REV CODE 250 W/ 637 OVERRIDE(OP): Performed by: UROLOGY

## 2021-06-12 PROCEDURE — A9270 NON-COVERED ITEM OR SERVICE: HCPCS | Performed by: UROLOGY

## 2021-06-12 PROCEDURE — 36415 COLL VENOUS BLD VENIPUNCTURE: CPT

## 2021-06-12 PROCEDURE — 80048 BASIC METABOLIC PNL TOTAL CA: CPT

## 2021-06-12 PROCEDURE — 700102 HCHG RX REV CODE 250 W/ 637 OVERRIDE(OP): Performed by: INTERNAL MEDICINE

## 2021-06-12 RX ORDER — TAMSULOSIN HYDROCHLORIDE 0.4 MG/1
0.4 CAPSULE ORAL 2 TIMES DAILY
Qty: 60 CAPSULE | Refills: 0 | Status: SHIPPED | OUTPATIENT
Start: 2021-06-12

## 2021-06-12 RX ADMIN — ATORVASTATIN CALCIUM 20 MG: 20 TABLET, FILM COATED ORAL at 05:21

## 2021-06-12 RX ADMIN — TAMSULOSIN HYDROCHLORIDE 0.4 MG: 0.4 CAPSULE ORAL at 05:21

## 2021-06-12 RX ADMIN — VALSARTAN 80 MG: 80 TABLET, FILM COATED ORAL at 05:21

## 2021-06-12 ASSESSMENT — PAIN DESCRIPTION - PAIN TYPE
TYPE: ACUTE PAIN;SURGICAL PAIN
TYPE: ACUTE PAIN;SURGICAL PAIN

## 2021-06-12 ASSESSMENT — ENCOUNTER SYMPTOMS: FEVER: 0

## 2021-06-12 NOTE — CARE PLAN
The patient is Stable - Low risk of patient condition declining or worsening    Shift Goals  Clinical Goals: Pain control    Progress made toward(s) clinical / shift goals:  Pain assessments and pain interventions    Patient is not progressing towards the following goals: None

## 2021-06-12 NOTE — DISCHARGE INSTRUCTIONS
Bladder Stone    A bladder stone is a buildup of crystals made from the proteins and minerals found in urine. These substances build up when your urine becomes too concentrated. Bladder stones usually develop when you have another medical condition that prevents your bladder from emptying completely. Crystals can form in the small amount of urine left in your bladder.  Bladder stones that grow large can become painful and block the flow of urine.  What are the causes?  Bladder stones can be caused by:  · An enlarged prostate, which prevents the bladder from emptying well.  · A urinary tract infection (UTI).  · A weak spot in the bladder that creates a small pouch (bladder diverticulum).  · Nerve damage that may interfere with the messages from your brain to your bladder muscles (neurogenic bladder). This can result from conditions such as Parkinson disease or spinal cord injuries.  What increases the risk?  This condition is more likely to develop in people who:  · Get frequent UTIs.  · Have another medical condition that affects their bladder.  · Have a history of bladder surgery.  · Have a spinal cord injury.  · Have an abnormally shaped bladder (deformity).  What are the signs or symptoms?  Small bladder stones do not always cause symptoms. Larger stones can cause symptoms that include:  · Abdominal pain.  · A frequent need to urinate.  · Difficulty urinating.  · Painful urination.  · Blood in the urine.  · Cloudy or dark colored urine.  · Pain in the penis or testicles for men.  How is this diagnosed?  This condition is diagnosed based on your symptoms, medical history, and a physical exam. The exam will include checking for abdominal tenderness. For men, a rectal exam may be done to check the prostate gland. You may also have other tests, such as:  · A urine test (urinalysis) to find out more about your condition.  · A urine sample test to check for other infections (culture).  · Blood tests, including tests to  look for a substance called creatinine. A creatinine level that is higher than normal could indicate a blockage.  · A procedure to examine the inside of your bladder using a thin scope with a tiny lighted camera (cystoscopy) inserted through the urethra.  You may also have imaging studies such as:  · A CT scan of your abdomen and pelvis to look for a stone and check whether it is blocking the flow of urine.  · An X-ray of your kidneys, ureters, bladder, and urethra after you have a type of dye (contrast material) injected into your veins (intravenous pyelogram or IVP).  · An abdominal and pelvic ultrasound to locate bladder stones and identify areas where urine flow is blocked.  How is this treated?    Small bladder stones do not require treatment. They can pass out of your body on their own. You may be instructed to drink extra water to help the stone pass through the bladder.  Larger stones may need to be removed with one of the following procedures:  · Cystolitholapaxy. A cystoscope is inserted through the urethra and into the bladder to view the stone. A laser, ultrasound, or other device is used to break the stone into smaller pieces. Fluids are used to flush the small pieces from the area.  · Surgical removal. You may need surgery to remove the stone if it is large and causing pain. A small incision is made in the bladder to directly remove the stone.  · If the stone blocks the flow of urine, you may have a thin, flexible tube (stent) threaded into your ureter. The stent may be left in place after removal of a stone to ensure flow of urine until healing is complete.  Follow these instructions at home:  · Drink enough fluid to keep your urine pale yellow.  · Report unusual urinary symptoms to your health care provider. Early diagnosis of an enlarged prostate and other bladder conditions may reduce your chance of getting bladder stones.  · Avoid smoking and illegal drug use.  Contact a health care provider  if:  · You have a fever.  · You feel nauseous or vomit.  · You are unable to urinate.  · You have a large amount of blood in your urine.  Get help right away if:  · You have severe back pain or lower abdominal pain.  · You are vomiting and cannot keep down any medicines or water.  This information is not intended to replace advice given to you by your health care provider. Make sure you discuss any questions you have with your health care provider.  Document Released: 01/01/2017 Document Revised: 12/28/2018 Document Reviewed: 01/01/2017  Elsevier Patient Education © 2020 [employer]        Indwelling Urinary Catheter Care, Adult  An indwelling urinary catheter is a thin, flexible, germ-free (sterile) tube that is placed into the bladder to help drain urine out of the body. The catheter is inserted into the part of the body that drains urine from the bladder (urethra). Urine drains from the catheter into a drainage bag outside of the body.  Taking good care of your catheter will keep it working properly and help to prevent problems from developing.  What are the risks?  · Bacteria may get into your bladder and cause a urinary tract infection.  · Urine flow can become blocked. This can happen if the catheter is not working correctly, or if you have sediment or a blood clot in your bladder or the catheter.  · Tissue near the catheter may become irritated and bleed.  How to wear your catheter and your drainage bag  Supplies needed  · Adhesive tape or a leg strap.  · Alcohol wipe or soap and water (if you use tape).  · A clean towel (if you use tape).  · Overnight drainage bag.  · Smaller drainage bag (leg bag).  Wearing your catheter and bag  Use adhesive tape or a leg strap to attach your catheter to your leg.  · Make sure the catheter is not pulled tight.  · If a leg strap gets wet, replace it with a dry one.  · If you use adhesive tape:  1. Use an alcohol wipe or soap and water to wash off any stickiness on your  skin where you had tape before.  2. Use a clean towel to pat-dry the area.  3. Apply the new tape.  You should have received a large overnight drainage bag and a smaller leg bag that fits underneath clothing.  · You may wear the overnight bag at any time, but you should not wear the leg bag at night.  · Always wear the leg bag below your knee.  · Make sure the overnight drainage bag is always lower than the level of your bladder, but do not let it touch the floor. Before you go to sleep, hang the bag inside a wastebasket that is covered by a clean plastic bag.  How to care for your skin around the catheter         Supplies needed  · A clean washcloth.  · Water and mild soap.  · A clean towel.  Caring for your skin and catheter  · Every day, use a clean washcloth and soapy water to clean the skin around your catheter.  ? Wash your hands with soap and water.  ? Wet a washcloth in warm water and mild soap.  ? Clean the skin around your urethra.  ? If you are female:  ? Use one hand to gently spread the folds of skin around your vagina (labia).  ? With the washcloth in your other hand, wipe the inner side of your labia on each side. Do this in a front-to-back direction.  ? If you are male:  ? Use one hand to pull back any skin that covers the end of your penis (foreskin).  ? With the washcloth in your other hand, wipe your penis in small circles. Start wiping at the tip of your penis, then move outward from the catheter.  ? Move the foreskin back in place, if this applies.  ? With your free hand, hold the catheter close to where it enters your body. Keep holding the catheter during cleaning so it does not get pulled out.  ? Use your other hand to clean the catheter with the washcloth.  ? Only wipe downward on the catheter.  ? Do not wipe upward toward your body, because that may push bacteria into your urethra and cause infection.  ? Use a clean towel to pat-dry the catheter and the skin around it. Make sure to wipe off  all soap.  ? Wash your hands with soap and water.  · Shower every day. Do not take baths.  · Do not use cream, ointment, or lotion on the area where the catheter enters your body, unless your health care provider tells you to do that.  · Do not use powders, sprays, or lotions on your genital area.  · Check your skin around the catheter every day for signs of infection. Check for:  ? Redness, swelling, or pain.  ? Fluid or blood.  ? Warmth.  ? Pus or a bad smell.  How to empty the drainage bag  Supplies needed  · Rubbing alcohol.  · Gauze pad or cotton ball.  · Adhesive tape or a leg strap.  Emptying the bag  Empty your drainage bag (your overnight drainage bag or your leg bag) when it is ?-½ full, or at least 2-3 times a day. Clean the drainage bag according to the 's instructions or as told by your health care provider.  1. Wash your hands with soap and water.  2. Detach the drainage bag from your leg.  3. Hold the drainage bag over the toilet or a clean container. Make sure the drainage bag is lower than your hips and bladder. This stops urine from going back into the tubing and into your bladder.  4. Open the pour spout at the bottom of the bag.  5. Empty the urine into the toilet or container. Do not let the pour spout touch any surface. This precaution is important to prevent bacteria from getting in the bag and causing infection.  6. Apply rubbing alcohol to a gauze pad or cotton ball.  7. Use the gauze pad or cotton ball to clean the pour spout.  8. Close the pour spout.  9. Attach the bag to your leg with adhesive tape or a leg strap.  10. Wash your hands with soap and water.  How to change the drainage bag  Supplies needed:  · Alcohol wipes.  · A clean drainage bag.  · Adhesive tape or a leg strap.  Changing the bag  Replace your drainage bag with a clean bag if it leaks, starts to smell bad, or looks dirty.  1. Wash your hands with soap and water.  2. Detach the dirty drainage bag from your  leg.  3. Pinch the catheter with your fingers so that urine does not spill out.  4. Disconnect the catheter tube from the drainage tube at the connection valve. Do not let the tubes touch any surface.  5. Clean the end of the catheter tube with an alcohol wipe. Use a different alcohol wipe to clean the end of the drainage tube.  6. Connect the catheter tube to the drainage tube of the clean bag.  7. Attach the clean bag to your leg with adhesive tape or a leg strap. Avoid attaching the new bag too tightly.  8. Wash your hands with soap and water.  General instructions    · Never pull on your catheter or try to remove it. Pulling can damage your internal tissues.  · Always wash your hands before and after you handle your catheter or drainage bag. Use a mild, fragrance-free soap. If soap and water are not available, use hand .  · Always make sure there are no twists or bends (kinks) in the catheter tube.  · Always make sure there are no leaks in the catheter or drainage bag.  · Drink enough fluid to keep your urine pale yellow.  · Do not take baths, swim, or use a hot tub.  · If you are female, wipe from front to back after having a bowel movement.  Contact a health care provider if:  · Your urine is cloudy.  · Your urine smells unusually bad.  · Your catheter gets clogged.  · Your catheter starts to leak.  · Your bladder feels full.  Get help right away if:  · You have redness, swelling, or pain where the catheter enters your body.  · You have fluid, blood, pus, or a bad smell coming from the area where the catheter enters your body.  · The area where the catheter enters your body feels warm to the touch.  · You have a fever.  · You have pain in your abdomen, legs, lower back, or bladder.  · You see blood in the catheter.  · Your urine is pink or red.  · You have nausea, vomiting, or chills.  · Your urine is not draining into the bag.  · Your catheter gets pulled out.  Summary  · An indwelling urinary  catheter is a thin, flexible, germ-free (sterile) tube that is placed into the bladder to help drain urine out of the body.  · The catheter is inserted into the part of the body that drains urine from the bladder (urethra).  · Take good care of your catheter to keep it working properly and help prevent problems from developing.  · Always wash your hands before and after you handle your catheter or drainage bag.  · Never pull on your catheter or try to remove it.  This information is not intended to replace advice given to you by your health care provider. Make sure you discuss any questions you have with your health care provider.  Document Released: 12/18/2006 Document Revised: 04/10/2020 Document Reviewed: 08/03/2018  [employer] Patient Education © 2020 [employer]      Discharge Instructions    Discharged to home by car with relative. Discharged via wheelchair, hospital escort: Yes.  Special equipment needed: Not Applicable    Be sure to schedule a follow-up appointment with your primary care doctor or any specialists as instructed.     Discharge Plan:        I understand that a diet low in cholesterol, fat, and sodium is recommended for good health. Unless I have been given specific instructions below for another diet, I accept this instruction as my diet prescription.   Other diet: Reg    Special Instructions: None    · Is patient discharged on Warfarin / Coumadin?   No     Depression / Suicide Risk    As you are discharged from this [facility] facility, it is important to learn how to keep safe from harming yourself.    Recognize the warning signs:  · Abrupt changes in personality, positive or negative- including increase in energy   · Giving away possessions  · Change in eating patterns- significant weight changes-  positive or negative  · Change in sleeping patterns- unable to sleep or sleeping all the time   · Unwillingness or inability to communicate  · Depression  · Unusual sadness, discouragement and  loneliness  · Talk of wanting to die  · Neglect of personal appearance   · Rebelliousness- reckless behavior  · Withdrawal from people/activities they love  · Confusion- inability to concentrate     If you or a loved one observes any of these behaviors or has concerns about self-harm, here's what you can do:  · Talk about it- your feelings and reasons for harming yourself  · Remove any means that you might use to hurt yourself (examples: pills, rope, extension cords, firearm)  · Get professional help from the community (Mental Health, Substance Abuse, psychological counseling)  · Do not be alone:Call your Safe Contact- someone whom you trust who will be there for you.  · Call your local CRISIS HOTLINE 453-0384 or 667-537-6298  · Call your local Children's Mobile Crisis Response Team [place] (467) 097-0658 or www.[employer]  · Call the toll free National Suicide Prevention Hotlines   · National Suicide Prevention Lifeline 490-017-XFNG (2277)  · National Hope Line Network 800-SUICIDE (548-3495)    Diet: Diet Order Diet: Regular  Activity: As tolerated  Follow Up: Dr Huggins on 6/14, PCP in one week  Disposition:Home  Diagnosis: Urinary retention, left sided ureteral stone     Follow up with your Primary Care Provider Deny Delcid M.D. as scheduled or sooner if your symptoms persist or worsen.  Return to Emergency Room for severe chest pain, shortness of breath, signs of a stroke, or any other emergencies.

## 2021-06-12 NOTE — PROGRESS NOTES
Pt has scant sanguineous drainage to cath site.  Pt c/o pain when engaging muscles of groin and lower abd but declined interventions this shift; pt stated it was 6/10 in beginning of shift and states it is 2/10 this morning.  Carcamo draining bloody urine; however, this morning, it is less bloody.  Barrier paste applied to scrotum per pt request on blanching redness there.

## 2021-06-12 NOTE — DISCHARGE SUMMARY
Discharge Summary    CHIEF COMPLAINT ON ADMISSION  Chief Complaint   Patient presents with   • Flank Pain     Patient report of left flank pain with painful with urination and urgency.  No blood in urine. No fever.  Radiating pain to the left side but not right now        Reason for Admission  Lower Back Pain, Difficulty Urinat*     Admission Date  6/10/2021    CODE STATUS  Full Code    HPI & HOSPITAL COURSE  This is a 76 y.o. male here with left sided flank pain and urinary retention; fitch was placed in the ED, and CT demonstrated a 11 mm proximal left ureteral stone producing mild to moderate left hydronephrosis. He underwent  L ureteroscopy and laser lithotripsy with stenting on 6/11/21 with Dr Huggins. His CARLY has resolved, his UA does not show evidence of infection, and he is discharge with fitch in place per Urology - he will receive a leg bag and fitch teaching, and follow up with Urology on Monday for fitch removal.       Therefore, he is discharged in good and stable condition to home with close outpatient follow-up.    The patient met 2-midnight criteria for an inpatient stay at the time of discharge.    Discharge Date  6/12/21    FOLLOW UP ITEMS POST DISCHARGE  None    DISCHARGE DIAGNOSES  Active Problems:    Hypercholesteremia POA: Yes    Hypertension POA: Yes    BPH (benign prostatic hyperplasia) POA: Yes    Urinary retention POA: Yes    Renal failure POA: Yes    Ureteral stone POA: Yes    Hydronephrosis POA: Yes  Resolved Problems:    * No resolved hospital problems. *      FOLLOW UP  No future appointments.  Maximino Huggins M.D.  [address]  [place] [address]  [phone]      we will arrange fitch and stent on string removal in office on 6/14/21    Deny Delcid M.D.  [address]  [address]  [place] [address]  [phone]    In 1 week        MEDICATIONS ON DISCHARGE     Medication List      START taking these medications      Instructions   tamsulosin 0.4 MG capsule  Commonly known as:  FLOMAX   Take 1 capsule by mouth 2 times a day.  Dose: 0.4 mg        CONTINUE taking these medications      Instructions   atorvastatin 20 MG Tabs  Commonly known as: LIPITOR   Take 20 mg by mouth every day.  Dose: 20 mg     valsartan 80 MG Tabs  Commonly known as: DIOVAN   Take 80 mg by mouth every day.  Dose: 80 mg        STOP taking these medications    terazosin 1 MG Caps  Commonly known as: HYTRIN            Allergies  No Known Allergies    DIET  Orders Placed This Encounter   Procedures   • Diet Order Diet: Regular     Standing Status:   Standing     Number of Occurrences:   1     Order Specific Question:   Diet:     Answer:   Regular [1]       ACTIVITY  As tolerated.  Weight bearing as tolerated    CONSULTATIONS  Urology    PROCEDURES  Ureteroscopy with laser lithotripsy and stent placement     LABORATORY  Lab Results   Component Value Date    SODIUM 139 06/12/2021    POTASSIUM 4.4 06/12/2021    CHLORIDE 106 06/12/2021    CO2 22 06/12/2021    GLUCOSE 118 (H) 06/12/2021    BUN 21 06/12/2021    CREATININE 1.11 06/12/2021        Lab Results   Component Value Date    WBC 4.5 (L) 06/12/2021    HEMOGLOBIN 11.2 (L) 06/12/2021    HEMATOCRIT 35.1 (L) 06/12/2021    PLATELETCT 183 06/12/2021        Total time of the discharge process exceeds 32 minutes.

## 2021-06-12 NOTE — PROGRESS NOTES
Note to reader: this note follows the APSO format rather than the historical SOAP format. Assessment and plan located at the top of the note for ease of use.    Chief Complaint  76 y.o. year old male here with a right ureteral stone, urinary retention and CARLY    Assessment/Plan  Interval History   Right ureteral stone s/p right CULTS stent on string 6/11/21  Urinary retention  CARLY, resolved      Keep fitch and stent, plan removal in office on 6/14/21 (we will arrange)  OK for DC from  standpoint  Nothing further, Urology will sign off      Case discussed with patient and Urology-Dr. Joy GONZALES  Patient seen and examined      6/12. POD #1. Feeling better. Creatinine has normalized. Some hematuria. Terazosin changed to Flomax.            Review of Systems  Physical Exam   Review of Systems   Constitutional: Negative for fever.   Genitourinary: Positive for hematuria.     Vitals:    06/11/21 1800 06/11/21 1830 06/11/21 2100 06/12/21 0428   BP: 127/71 131/71 130/73 121/61   Pulse: 65 63 63 60   Resp: 15 17 17 17   Temp: 36.6 °C (97.8 °F) 36.6 °C (97.8 °F) 36.6 °C (97.9 °F) 36.6 °C (97.9 °F)   TempSrc: Temporal Temporal Oral Oral   SpO2: 89% 89% 96% 95%   Weight:       Height:         Physical Exam  Vitals and nursing note reviewed.   Constitutional:       General: He is not in acute distress.     Appearance: Normal appearance.   Genitourinary:     Comments: Fitch draining watermelon colored urine  Neurological:      Mental Status: He is alert.          Hematology Chemistry   Lab Results   Component Value Date/Time    WBC 4.5 (L) 06/12/2021 02:51 AM    HEMOGLOBIN 11.2 (L) 06/12/2021 02:51 AM    HEMATOCRIT 35.1 (L) 06/12/2021 02:51 AM    PLATELETCT 183 06/12/2021 02:51 AM     Lab Results   Component Value Date/Time    SODIUM 139 06/12/2021 02:51 AM    POTASSIUM 4.4 06/12/2021 02:51 AM    CHLORIDE 106 06/12/2021 02:51 AM    CO2 22 06/12/2021 02:51 AM    GLUCOSE 118 (H) 06/12/2021 02:51 AM    BUN 21 06/12/2021 02:51 AM     CREATININE 1.11 06/12/2021 02:51 AM         Labs not explicitly included in this progress note were reviewed by the author.   Radiology/imaging not explicitly included in this progress note was reviewed by the author.     Medications reviewed and Labs reviewed

## 2021-06-12 NOTE — PROGRESS NOTES
0800 Assumed care of pt. A&O x4, denies pain/nausea. Discussed POC with pt and MD. No signs of distress. Called central supply for leg bag. [place]o education provided.     0930 Leg bag attached to patient. Education provided. Discharge instructions reviewed with patient and spouse. IV removed.    1040 Pt escorted to ED entrance in wheelchair with transport. Discharged in good and stable condition.

## 2021-06-18 LAB
APPEARANCE STONE: NORMAL
COMPN STONE: NORMAL
NUMBER STONE: 1
SIZE STONE: NORMAL MM
SPECIMEN WT: 8 MG

## 2022-09-15 ENCOUNTER — APPOINTMENT (OUTPATIENT)
Dept: RADIOLOGY | Facility: MEDICAL CENTER | Age: 77
End: 2022-09-15
Attending: PHYSICIAN ASSISTANT
Payer: MEDICARE

## (undated) DEVICE — JELLY SURGILUBE STERILE TUBE 4.25 OZ (1/EA)

## (undated) DEVICE — GOWN SURGEONS X-LARGE - DISP. (30/CA)

## (undated) DEVICE — SODIUM CHL. IRRIGATION 0.9% 3000ML (4EA/CA 65CA/PF)

## (undated) DEVICE — SHEATH NAVIGATOR 11/13 X 36 URETERAL ACCESS (5EA/BX)

## (undated) DEVICE — NEPTUNE 4 PORT MANIFOLD - (20/PK)

## (undated) DEVICE — TUBING CLEARLINK DUO-VENT - C-FLO (48EA/CA)

## (undated) DEVICE — SENSOR SPO2 NEO LNCS ADHESIVE (20/BX) SEE USER NOTES

## (undated) DEVICE — PROTECTOR ULNA NERVE - (36PR/CA)

## (undated) DEVICE — SET EXTENSION WITH 2 PORTS (48EA/CA) ***PART #2C8610 IS A SUBSTITUTE*****

## (undated) DEVICE — SLEEVE, VASO, THIGH, MED

## (undated) DEVICE — SUCTION INSTRUMENT YANKAUER BULBOUS TIP W/O VENT (50EA/CA)

## (undated) DEVICE — ELECTRODE 850 FOAM ADHESIVE - HYDROGEL RADIOTRNSPRNT (50/PK)

## (undated) DEVICE — TOWEL STOP TIMEOUT SAFETY FLAG (40EA/CA)

## (undated) DEVICE — GLOVE BIOGEL SZ 7.5 SURGICAL PF LTX - (50PR/BX 4BX/CA)

## (undated) DEVICE — SCOPE DIGITAL URETEROSCOPE DISPOSABLE

## (undated) DEVICE — GOWN SURGICAL X-LARGE ULTRA - FILM-REINFORCED (20/CA)

## (undated) DEVICE — MASK ANESTHESIA ADULT  - (100/CA)

## (undated) DEVICE — HEAD HOLDER JUNIOR/ADULT

## (undated) DEVICE — PACK CYSTOSCOPY III - (8/CA)

## (undated) DEVICE — SET IRRIGATION CYSTOSCOPY Y-TYPE L81 IN (20EA/CA)

## (undated) DEVICE — WATER IRRIG. STER 3000 ML - (4/CA)

## (undated) DEVICE — LACTATED RINGERS INJ 1000 ML - (14EA/CA 60CA/PF)

## (undated) DEVICE — KIT ANESTHESIA W/CIRCUIT & 3/LT BAG W/FILTER (20EA/CA)

## (undated) DEVICE — DRAPE TABLE UROLOGY DRAINAGE (20EA/BX)

## (undated) DEVICE — GOWN WARMING STANDARD FLEX - (30/CA)

## (undated) DEVICE — CATHETER URET OPEN END 6FR (10EA/BX)

## (undated) DEVICE — BASKET ZERO 1.9FR X 120CM

## (undated) DEVICE — SPONGE GAUZESTER 4 X 4 4PLY - (128PK/CA)

## (undated) DEVICE — LASER TRAC TIP 200 MIRCON FOR 100 WATT LASER